# Patient Record
Sex: MALE | Race: WHITE | NOT HISPANIC OR LATINO | Employment: FULL TIME | URBAN - METROPOLITAN AREA
[De-identification: names, ages, dates, MRNs, and addresses within clinical notes are randomized per-mention and may not be internally consistent; named-entity substitution may affect disease eponyms.]

---

## 2023-08-04 DIAGNOSIS — G70.00 OCULAR MYASTHENIA GRAVIS: Primary | ICD-10-CM

## 2023-08-22 ENCOUNTER — OFFICE VISIT (OUTPATIENT)
Dept: NEUROLOGY | Facility: CLINIC | Age: 63
End: 2023-08-22
Payer: COMMERCIAL

## 2023-08-22 VITALS
SYSTOLIC BLOOD PRESSURE: 138 MMHG | BODY MASS INDEX: 38.6 KG/M2 | DIASTOLIC BLOOD PRESSURE: 80 MMHG | HEIGHT: 72 IN | WEIGHT: 285 LBS

## 2023-08-22 DIAGNOSIS — G70.00 OCULAR MYASTHENIA GRAVIS: ICD-10-CM

## 2023-08-22 PROCEDURE — 99205 OFFICE O/P NEW HI 60 MIN: CPT | Mod: S$GLB,,, | Performed by: SPECIALIST

## 2023-08-22 PROCEDURE — 99205 PR OFFICE/OUTPT VISIT, NEW, LEVL V, 60-74 MIN: ICD-10-PCS | Mod: S$GLB,,, | Performed by: SPECIALIST

## 2023-08-22 PROCEDURE — 99999 PR PBB SHADOW E&M-EST. PATIENT-LVL III: ICD-10-PCS | Mod: PBBFAC,,, | Performed by: SPECIALIST

## 2023-08-22 PROCEDURE — 99999 PR PBB SHADOW E&M-EST. PATIENT-LVL III: CPT | Mod: PBBFAC,,, | Performed by: SPECIALIST

## 2023-08-22 RX ORDER — PREDNISONE 20 MG/1
20 TABLET ORAL DAILY
COMMUNITY
Start: 2023-08-03 | End: 2023-10-24 | Stop reason: SDUPTHER

## 2023-08-22 RX ORDER — ALLOPURINOL 300 MG/1
300 TABLET ORAL 2 TIMES DAILY
COMMUNITY
Start: 2023-07-31

## 2023-08-22 RX ORDER — PREDNISONE 20 MG/1
20 TABLET ORAL DAILY
Qty: 60 TABLET | Refills: 5 | Status: SHIPPED | OUTPATIENT
Start: 2023-08-22 | End: 2024-01-30

## 2023-08-22 RX ORDER — MELOXICAM 15 MG/1
15 TABLET ORAL DAILY PRN
COMMUNITY
Start: 2023-07-14

## 2023-08-22 RX ORDER — COLCHICINE 0.6 MG/1
1 TABLET ORAL DAILY
COMMUNITY
Start: 2023-07-31

## 2023-08-22 RX ORDER — ALPRAZOLAM 0.5 MG/1
0.5 TABLET ORAL 2 TIMES DAILY PRN
COMMUNITY
Start: 2023-07-14

## 2023-08-22 RX ORDER — PYRIDOSTIGMINE BROMIDE 60 MG/1
60 TABLET ORAL 3 TIMES DAILY
Qty: 90 TABLET | Refills: 11 | Status: SHIPPED | OUTPATIENT
Start: 2023-08-22 | End: 2023-10-24

## 2023-08-22 RX ORDER — AZATHIOPRINE 50 MG/1
TABLET ORAL
Qty: 60 TABLET | Refills: 5 | Status: SHIPPED | OUTPATIENT
Start: 2023-08-22 | End: 2023-10-24

## 2023-08-22 RX ORDER — ZOLPIDEM TARTRATE 10 MG/1
10 TABLET ORAL NIGHTLY
COMMUNITY
Start: 2023-08-16

## 2023-08-22 RX ORDER — PREDNISONE 10 MG/1
1.5 TABLET ORAL DAILY
COMMUNITY
Start: 2023-08-03 | End: 2023-08-22

## 2023-08-22 NOTE — PROGRESS NOTES
Subjective:      @Patient ID: Vignesh Lomas is a 63 y.o. male.    Chief Complaint: MG NP     HPI:            New Patient (NP-double vision when looking to the right x 1 month )  AchR Ab positive (all 3) ordered by eye doctor.     Notes may also be on facesheet for HPI, ROS, and other sections     Review of Systems         Social History     Tobacco Use    Smoking status: Never    Smokeless tobacco: Never   Substance Use Topics    Alcohol use: Not Currently     Comment: beer/wine 1-2 times per week    Drug use: Never      [] Single     [x]     [] []   [x] Working     [] Retired, worked as:    Consulted and    [x] Drives     [] Does not drive     ----------------------------  Gout, unspecified    Current Outpatient Medications   Medication Instructions    allopurinoL (ZYLOPRIM) 300 mg, Oral, 2 times daily    ALPRAZolam (XANAX) 0.5 mg, Oral, 2 times daily PRN    azaTHIOprine (IMURAN) 50 mg Tab Take 1 tablet (50 mg total) by mouth once daily for 30 days, THEN 2 tablets (100 mg total) once daily.    colchicine, gout, (COLCRYS) 0.6 mg tablet 1 tablet, Oral, Daily    dulaglutide (TRULICITY SUBQ) Subcutaneous, 2 mls per week     meloxicam (MOBIC) 15 mg, Oral, Daily PRN    predniSONE (DELTASONE) 20 mg, Oral, Daily    predniSONE (DELTASONE) 20 mg, Oral, Daily    pyridostigmine (MESTINON) 60 mg, Oral, 3 times daily    vit A/vit C/vit E/zinc/copper (ICAPS AREDS ORAL) Oral, 2 per day     zolpidem (AMBIEN) 10 mg, Oral, Nightly      Medications Discontinued During This Encounter   Medication Reason    predniSONE (DELTASONE) 10 MG tablet          Objective:        Exam:   Visit Vitals  /80 (BP Location: Left arm, Patient Position: Sitting, BP Method: Medium (Manual))   Ht 6' (1.829 m)   Wt 129.3 kg (285 lb)   BMI 38.65 kg/m²       General Exam  [] Unaccompanied   [x] Accompanied, by__ [x]Spouse     []Child            []_____________            body habitus_ Body mass index is 38.65  kg/m².    []Gen exam overall unremarkable    []Abnormalities, if present, checked     []Mental Status_alert and appropriate    []Oropharynx_Mallampati grade_1 or 2  [] OP   M3    [] M4  []Neck_ no bruits     [] Bruit   [x]Heart__ RRR s murmurs or extra beats   [] Irreg  []murmur  []Extremities_ no edema or lesions   [] Extr edema     Neurological:  []Normal neuro exam          [x]Cortical function seems normal  Abnormalities, if present, checked     [x]Speech __ normal    []    Cranial nerves:    []  [x]CN 2 VF_ok     []  []Fundi_ normal     []  [x]CN 3, 4, 6 EOMs_ok   []  [x]CN 3, pupils_ok   []  [x]CN 7_no lower face asymmetry  []  []CN 8_hearing _ ok   []  [x]CN 12 tongue_ok   []    [x]Motor__ normal all groups   []  []Tone: normal     []  [x]Reflexes__ normal or unremarkable  []  [x]Vib Sens_ normal in extr's incl toes  []  []Pin Sens_    []  [x]Plantars__ flat     []  [x]Tremor: _ none    []  [x]Coordination: _ F to N normal  []  [x]Gait_      []Cane  []Wheelchair  []_______  [x]Romberg: negative    []Romberg positive     []MMSE; if done:  No flowsheet data found.     Neuroimaging:  [x] Images and imaging reports reviewed.  Rads summary:  My comments: bMRI normal     Labs: Ach R Ab's pos  Cbc ok   Chem's ok   A1c ok       [x]  New Patient         []  Multiple Issues/ diagnoses or problems  [if not enumerated in note then discussed but not documented]    Complexity of Data:   [x] High    [] Moderate   [] Images and reports reviewed  [] Other studies reviewed   [] History obtained from accompaniment [] Differential Diagnoses discussed   [] Studies considered/ discussed but not ordered [] Studies ordered     Risks:   [x] High     [] Moderate   [] (poss or definite) neurodegenerative condition [x] () autoimmune condition with possibility of flares or unexpected attack  [] () seiz d.o. 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     Dispense:  60 tablet     Refill:  5    predniSONE (DELTASONE) 20 MG tablet     Sig: Take 1 tablet (20 mg total) by mouth once daily.     Dispense:  60 tablet     Refill:  5    pyridostigmine (MESTINON) 60 mg Tab     Sig: Take 1 tablet (60 mg total) by mouth 3 (three) times daily.     Dispense:  90 tablet     Refill:  11      Pyridostigmine breakfast first week then breakfast lunch second week then breakfast lunch supper     Follow up in about 6 weeks (around 10/3/2023) for Virtual Visit.    Jamie Alonso MD CLAUDIA FAAN, [facility]  Neuroscience Center Medical Director   [facility]

## 2023-08-23 ENCOUNTER — TELEPHONE (OUTPATIENT)
Dept: NEUROLOGY | Facility: CLINIC | Age: 63
End: 2023-08-23
Payer: COMMERCIAL

## 2023-08-23 NOTE — TELEPHONE ENCOUNTER
Josh ortiz's levels of azathioprine  so I called pharm and he will take half tab first mo then whole tab

## 2023-08-23 NOTE — TELEPHONE ENCOUNTER
Elizabeth with [facility] called to report med interaction with rx sent yesterday. States they rec'd rx for azathioprine which has interaction with allopurinol. Calling for further direction    Phone: [phone]

## 2023-09-06 ENCOUNTER — PATIENT MESSAGE (OUTPATIENT)
Dept: NEUROLOGY | Facility: CLINIC | Age: 63
End: 2023-09-06
Payer: COMMERCIAL

## 2023-09-07 NOTE — TELEPHONE ENCOUNTER
CMP rec'd and forwarded to you; the CBC that was ordered was not completed  ..  The patient did have a CBC in July (forwarded to you): Do you still want this repeated?

## 2023-09-15 ENCOUNTER — PATIENT MESSAGE (OUTPATIENT)
Dept: NEUROLOGY | Facility: CLINIC | Age: 63
End: 2023-09-15
Payer: COMMERCIAL

## 2023-10-03 ENCOUNTER — OFFICE VISIT (OUTPATIENT)
Dept: NEUROLOGY | Facility: CLINIC | Age: 63
End: 2023-10-03
Payer: COMMERCIAL

## 2023-10-03 DIAGNOSIS — G70.00 OCULAR MYASTHENIA GRAVIS: Primary | ICD-10-CM

## 2023-10-03 PROCEDURE — 99213 OFFICE O/P EST LOW 20 MIN: CPT | Mod: 95,,, | Performed by: SPECIALIST

## 2023-10-03 PROCEDURE — 99213 PR OFFICE/OUTPT VISIT, EST, LEVL III, 20-29 MIN: ICD-10-PCS | Mod: 95,,, | Performed by: SPECIALIST

## 2023-10-03 NOTE — PROGRESS NOTES
This is a telemedicine note.   Patient was treated using telemedicine, real time audio and video, according to [facility] protocols.   I, Jamie Alonso MD, conducted the visit from the Neurology clinic of [facility].   The patient participated in the visit at a non-[facility] location selected by the patient, identified below.   I am licensed in the state where the patient stated they are located.   The patient stated that they understood and accepted the privacy and security risks to their information at their location.   This visit is not recorded.    Patient was located at the patient's home.     Vignesh Lomas is a 63 y.o. male seen today via telemedicine visit.       Subjective:         Patient ID: Vignesh Lomas is a 63 y.o. male.    Chief Complaint: diplopia (gone now)     HPI:           No chief complaint on file.  Vsion better gut is a wreck and he feels due to pyridostigmine     notes may also be on facesheet for HPI, ROS, and other sections     Review of Systems            Social History     Socioeconomic History    Marital status:    Tobacco Use    Smoking status: Never    Smokeless tobacco: Never   Substance and Sexual Activity    Alcohol use: Not Currently     Comment: beer/wine 1-2 times per week    Drug use: Never       Current Outpatient Medications   Medication Instructions    allopurinoL (ZYLOPRIM) 300 mg, Oral, 2 times daily    ALPRAZolam (XANAX) 0.5 mg, Oral, 2 times daily PRN    azaTHIOprine (IMURAN) 50 mg Tab Take 1 tablet (50 mg total) by mouth once daily     colchicine, gout, (COLCRYS) 0.6 mg tablet 1 tablet, Oral, Daily    dulaglutide (TRULICITY SUBQ) Subcutaneous, 2 mls per week     meloxicam (MOBIC) 15 mg, Oral, Daily PRN    predniSONE (DELTASONE) 20 mg, Oral, Daily    pyridostigmine (MESTINON) 60 mg, Oral, 3 times daily    vit A/vit C/vit E/zinc/copper (ICAPS AREDS ORAL) Oral, 2 per day     zolpidem (AMBIEN) 10 mg, Oral, Nightly        Objective:      Exam Limited due to  telemedicine restrictions.  There were no vitals taken for this visit.    General:   if accompanied, by:_w     Neurological  Speech: good   EOMs: seem ok   coord:   Gait:     Neuroimaging:  Images and imaging reports reviewed.  My comments:     Labs:    meds:        Assessment/Plan:       Problem List Items Addressed This Visit    None  Visit Diagnoses       Ocular myasthenia gravis    -  Primary          Stop pyridostigm since having gi se's     Cont predn 20mg   And cont imuran 50mg   Other comments/ follow up:        Imaging orders (if any):   No orders of the defined types were placed in this encounter.        __med refilled   __med prescribed  __med modified    Follow up in about 2 weeks (around 10/17/2023) for Virtual Visit.    Video Time Documentation:  Spent 5 minutes with patient over video discussing health concerns.   Total time this visit:    7  min    Jamie Alonso MD CLAUDIA FAAN FAASM

## 2023-10-11 ENCOUNTER — PATIENT MESSAGE (OUTPATIENT)
Dept: NEUROLOGY | Facility: CLINIC | Age: 63
End: 2023-10-11
Payer: COMMERCIAL

## 2023-10-24 ENCOUNTER — OFFICE VISIT (OUTPATIENT)
Dept: NEUROLOGY | Facility: CLINIC | Age: 63
End: 2023-10-24
Payer: COMMERCIAL

## 2023-10-24 DIAGNOSIS — G70.00 MYASTHENIA GRAVIS: Primary | ICD-10-CM

## 2023-10-24 PROCEDURE — 99214 OFFICE O/P EST MOD 30 MIN: CPT | Mod: 95,,, | Performed by: SPECIALIST

## 2023-10-24 PROCEDURE — 99214 PR OFFICE/OUTPT VISIT, EST, LEVL IV, 30-39 MIN: ICD-10-PCS | Mod: 95,,, | Performed by: SPECIALIST

## 2023-10-24 RX ORDER — PREDNISONE 20 MG/1
TABLET ORAL
Qty: 60 TABLET | Refills: 2 | Status: SHIPPED | OUTPATIENT
Start: 2023-10-24 | End: 2024-01-30

## 2023-10-24 RX ORDER — MYCOPHENOLATE MOFETIL 500 MG/1
500 TABLET ORAL 2 TIMES DAILY
Qty: 60 TABLET | Refills: 5 | Status: SHIPPED | OUTPATIENT
Start: 2023-10-24 | End: 2024-01-30 | Stop reason: SDUPTHER

## 2023-10-24 NOTE — PROGRESS NOTES
This is a telemedicine note.   Patient was treated using telemedicine, real time audio and video, according to [facility] protocols.   I, Jamie Alonso MD, conducted the visit from the Neurology clinic of [facility].   The patient participated in the visit at a non-[facility] location selected by the patient, identified below.   I am licensed in the state where the patient stated they are located.   The patient stated that they understood and accepted the privacy and security risks to their information at their location.   This visit is not recorded.    Patient was located at the patient's home.     Vignesh Lomas is a 63 y.o. male seen today via telemedicine visit.       Subjective:         Patient ID: Vignesh Lomas is a 63 y.o. male.    Chief Complaint: feeling well without diplopia or dysphagia     HPI:           No chief complaint on file.  As above   Asking about infusions   Asking about other steroid sparing po meds since he did not tolerate imuran   He is currently taking 20 mg prednisone daily.  He has  10 mg tabs at home as well.    notes may also be on facesheet for HPI, ROS, and other sections     Review of Systems  As above          Social History     Socioeconomic History    Marital status:    Tobacco Use    Smoking status: Never    Smokeless tobacco: Never   Substance and Sexual Activity    Alcohol use: Not Currently     Comment: beer/wine 1-2 times per week    Drug use: Never     __Lives alone  Lives with ___  __Drives   __Does not drive   __Working   Retired:     Current Outpatient Medications   Medication Instructions    allopurinoL (ZYLOPRIM) 300 mg, Oral, 2 times daily    ALPRAZolam (XANAX) 0.5 mg, Oral, 2 times daily PRN    colchicine, gout, (COLCRYS) 0.6 mg tablet 1 tablet, Oral, Daily    dulaglutide (TRULICITY SUBQ) Subcutaneous, 2 mls per week     meloxicam (MOBIC) 15 mg, Oral, Daily PRN    predniSONE (DELTASONE) 20 mg, Oral, Daily    predniSONE (DELTASONE) 20 mg, Oral, Daily    vit  A/vit C/vit E/zinc/copper (ICAPS AREDS ORAL) Oral, 2 per day     zolpidem (AMBIEN) 10 mg, Oral, Nightly        Objective:      Exam Limited due to telemedicine restrictions.  There were no vitals taken for this visit.    General:   if accompanied, by:_ wife at his side     Neurological  Speech: clear   EOMs:  L ptosis it seems but he did not complain of such   coord:   Gait:     Neuroimaging:  Images and imaging reports reviewed.  My comments:     Labs:  AchR Ab's had been positive     meds:      ___ multiple issues/ diagnoses or problems [if not enumerated in note then discussed in encounter but not documented]    complexity of data     __high _mod   Wife chimed in   Discussed steroid sparing options  Discussed why he is not a candidate for infusions    Risks    _;_high _mod   __ (possible or definite) neurodegenerative condition and inherent progression  _ * _ (poss or def) autoimmune condition with possibility of flares or unexpected attack  __ (poss or def) seiz d.o. with possib of recurr seiz's   __ cerebrovasc ds with risk of recurrence of stroke  _ * _ CNS meds (and/or) potentially high risk non CNS meds which may cause medical or behavioral side effects  __ fall risk  __ driving discussed   __ diagnosis unclear or DDx wide making risk uncertain to high  __other:    MDM/Medical Decision Making     __high  _ * moderate           Assessment/Plan:       Problem List Items Addressed This Visit    None  Visit Diagnoses       Myasthenia gravis    -  Primary            Other comments/ follow up:      Encouraged him to decrease his prednisone at 20 mg 1 day alternating with 10 mg the next until December 1st.  December 1st, I would like him to start 10 mg every day.  CellCept 500 mg twice daily starting now.  Aim for virtual follow-up visit in January.  The visit subsequent to that would ideally be a face-to-face visit  Imaging orders (if any):   No orders of the defined types were placed in this encounter.      Medications Ordered This Encounter   Medications    mycophenolate (CELLCEPT) 500 mg Tab     Sig: Take 1 tablet (500 mg total) by mouth 2 (two) times daily.     Dispense:  60 tablet     Refill:  5    predniSONE (DELTASONE) 20 MG tablet     Si mg alternating with 10 mg every other day until  at which time he will take 10 mg daily until further notice     Dispense:  60 tablet     Refill:  2       Follow up in about 3 months (around 2024) for Virtual Visit.    Video Time Documentation:  Spent 12 minutes with patient over video discussing health concerns.   Total time this visit:    18  min    Jamie Alonso MD CLAUDIA FAAN FAASM

## 2024-01-24 ENCOUNTER — TELEPHONE (OUTPATIENT)
Dept: NEUROLOGY | Facility: CLINIC | Age: 64
End: 2024-01-24
Payer: COMMERCIAL

## 2024-01-24 NOTE — TELEPHONE ENCOUNTER
"Pt contacted office regarding a "head cold"; denies fever. Episode of eye and arm twitching since becoming sick. Wanting to discuss over the counter treatment options for symptoms with his dx of MG. Please advise  "

## 2024-01-25 NOTE — TELEPHONE ENCOUNTER
S/w patient - he has covid - he is taking paxlovid; ok for Claritin or Zyrtec and regular Mucinex

## 2024-01-30 ENCOUNTER — OFFICE VISIT (OUTPATIENT)
Dept: NEUROLOGY | Facility: CLINIC | Age: 64
End: 2024-01-30
Payer: COMMERCIAL

## 2024-01-30 DIAGNOSIS — G70.00 MYASTHENIA GRAVIS: Primary | ICD-10-CM

## 2024-01-30 DIAGNOSIS — T14.8XXA BRUISING: ICD-10-CM

## 2024-01-30 PROCEDURE — 99214 OFFICE O/P EST MOD 30 MIN: CPT | Mod: 95,,, | Performed by: SPECIALIST

## 2024-01-30 RX ORDER — PREDNISONE 10 MG/1
TABLET ORAL
Qty: 90 TABLET | Refills: 1 | Status: SHIPPED | OUTPATIENT
Start: 2024-01-30

## 2024-01-30 RX ORDER — MYCOPHENOLATE MOFETIL 500 MG/1
1000 TABLET ORAL 2 TIMES DAILY
Qty: 120 TABLET | Refills: 5 | Status: SHIPPED | OUTPATIENT
Start: 2024-01-30 | End: 2024-07-28

## 2024-01-30 NOTE — PROGRESS NOTES
This is a telemedicine note.   Patient was treated using telemedicine, real time audio and video, according to [facility] protocols. This visit is not recorded.  I, Jamie Alonso MD, conducted the visit from the Neurology clinic of [facility]. The patient participated in the visit at a non-[facility] location selected by the patient, [facility].   I am licensed in [place] where the patient stated they are located. The patient stated that they understood and accepted the privacy and security risks to their information at their location.     Vignesh Lomas is a 63 y.o. male seen today via telemedicine visit.   Subjective:    Patient ID: Vignesh Lomas is a 63 y.o. male.  Chief Complaint: mg fu   HPI:           Wants off prednisone   eyes are great   some bruising   has cleared up some     Unfortunately did not follow the decr in prednisone dose I'd planned so curr still taking 20mg alternating with 10mg and cellcept at 500mg bid     Current Outpatient Medications   Medication Instructions    allopurinoL (ZYLOPRIM) 300 mg, Oral, 2 times daily    ALPRAZolam (XANAX) 0.5 mg, Oral, 2 times daily PRN    colchicine, gout, (COLCRYS) 0.6 mg tablet 1 tablet, Oral, Daily    dulaglutide (TRULICITY SUBQ) Subcutaneous, 2 mls per week     meloxicam (MOBIC) 15 mg, Oral, Daily PRN    mycophenolate (CELLCEPT) 500 mg, Oral, 2 times daily    predniSONE (DELTASONE) 20 MG tablet 20 mg alternating with 10 mg every other day     vit A/vit C/vit E/zinc/copper (ICAPS AREDS ORAL) Oral, 2 per day     zolpidem (AMBIEN) 10 mg, Oral, Nightly        Objective:      Exam Limited due to telemedicine restrictions.  There were no vitals taken for this visit.  if accompanied, by:_ n  Speech: ok   EOMs: seemed ok           Neuroimaging: MRI brain w/ and w/out @ [facility]: Reportedly ok  Labs:  Cbc ok in sept     AchR Ab   Blocking - 76  Modulating - 74  Binding - 3    _;__ multiple issues/ diagnoses or problems [if not enumerated in note then discussed in encounter  but not documented]    complexity of data     __high ;_mod   __ images and reports reviewed:  __ hx obtained from family or accompaniment:   __studies ordered __   __studies considered or discussed but not ordered __  __DDx discussed __    Risks    _;_high _mod   __ (possible or definite) neurodegenerative condition and inherent progression  _;_ (poss or def) autoimmune condition with possibility of flares or unexpected attack  __ (poss or def) seiz d.o. with possib of recurr seiz's   __ cerebrovasc ds with risk of recurrence of stroke  _;_ CNS meds (and/or) potentially high risk non CNS meds which may cause medical or behavioral side effects      MDM/Medical Decision Making     __high  ;_moderate   Assessment/Plan:     Problem List Items Addressed This Visit          Neuro    Myasthenia gravis - Primary       Orthopedic    Bruising       Other comments/ follow up:    Imaging orders (if any):   No orders of the defined types were placed in this encounter.     Medications Ordered This Encounter   Medications    mycophenolate (CELLCEPT) 500 mg Tab     Sig: Take 2 tablets (1,000 mg total) by mouth 2 (two) times daily.     Dispense:  120 tablet     Refill:  5    predniSONE (DELTASONE) 10 MG tablet     Sig: 10mg alternating with 5mg every other day for February.  March and until further notice 5mg every other day     Dispense:  90 tablet     Refill:  1       Video Time Documentation:  Spent 12 minutes with patient over video discussing health concerns.   Total time this visit:   20   min    Jamie Alonso MD CLAUDIA FAAN FAASM

## 2024-01-31 ENCOUNTER — PATIENT MESSAGE (OUTPATIENT)
Dept: NEUROLOGY | Facility: CLINIC | Age: 64
End: 2024-01-31
Payer: COMMERCIAL

## 2024-03-14 ENCOUNTER — PATIENT MESSAGE (OUTPATIENT)
Dept: NEUROLOGY | Facility: CLINIC | Age: 64
End: 2024-03-14
Payer: COMMERCIAL

## 2024-03-14 NOTE — TELEPHONE ENCOUNTER
No specific rec's other than the anesth ppl need to be aware he has myasthenia gravis     certain paralytics they should avoid but they should know this

## 2024-03-14 NOTE — LETTER
[facility] of [address] , [address]  [facility] [address]  Phone: [phone] March 15, 2024     Patient: Vignesh Lomas   YOB: 1960    Neuro surg clearance       To Whom It May Concern:    In regards to Mr. Lomas's upcoming surgical procedure:    From a neurological standpoint, no specific recommendations other than anesthesia provider/staff need to be aware he has myasthenia gravis. Certain paralytics they should avoid, however, they should be aware of what these are.          If you have any questions or concerns, please don't hesitate to contact my office.    Sincerely,            Kevin R Hargrave, MD MBA [facility] Medical Director   FAAN, [facility]/stacey

## 2024-04-05 NOTE — TELEPHONE ENCOUNTER
Nothing for you to do KD.    I called and spoke With him.    He is feeling fine off the prednisone.    We have a virtual visit in a few weeks.    knows that if symptoms recur he is to call us and probably will have to resume prednisone.

## 2024-04-23 ENCOUNTER — OFFICE VISIT (OUTPATIENT)
Dept: NEUROLOGY | Facility: CLINIC | Age: 64
End: 2024-04-23
Payer: COMMERCIAL

## 2024-04-23 DIAGNOSIS — G70.00 MYASTHENIA GRAVIS: Primary | ICD-10-CM

## 2024-04-23 PROCEDURE — 99213 OFFICE O/P EST LOW 20 MIN: CPT | Mod: 95,,, | Performed by: SPECIALIST

## 2024-04-23 NOTE — PROGRESS NOTES
Subjective:         Patient ID: Vignesh Lomas is a 64 y.o. male.    Chief Complaint: MG telemed fu       HPI:           MG stable   Recently had hernia surgery and did well but needs inguinal hernia sx now   Cellcept 1g bid   No prednisone since feb   No recent bruising but also not in rough conditions much   Asking about blood work       ...  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  General:   If Accompanied, by__ wife   heart:   pharynx:  Neurological   Speech: Ok        Labs:  Last cbc chem in sept were ok         Assessment/Plan:         ICD-10-CM ICD-9-CM   1. Myasthenia gravis  G70.00 358.00     Other comments/ follow up:      Aim revisit 6 mos telemed   His periop labs almost certainly were ok     Visit type: audiovisual    video time with patient: 10minutes     15 minutes of total time spent on the encounter, which includes face to face time and non-face to face time preparing to see the patient (eg, review of tests), Obtaining and/or reviewing separately obtained history, Documenting clinical information in the electronic or other health record, Independently interpreting results (not separately reported) and communicating results to the patient/family/caregiver, or Care coordination (not separately reported).       Each patient to whom he or she  provides medical services by telemedicine is:  (1) informed of the relationship between the physician and patient and the respective role of any other health care provider with respect to management of the patient; and (2) notified that he or she may decline to receive medical services by telemedicine and may withdraw from such care at any time.    The patient location is: home; or _____         MD EDDA AnthonyA FAAN FAASM

## 2024-08-06 ENCOUNTER — PATIENT MESSAGE (OUTPATIENT)
Dept: NEUROLOGY | Facility: CLINIC | Age: 64
End: 2024-08-06
Payer: COMMERCIAL

## 2024-08-06 NOTE — TELEPHONE ENCOUNTER
Spoke w him   I do not recommend but he may decide to take     I am ok either way   nothing for you to do

## 2024-08-27 ENCOUNTER — PATIENT MESSAGE (OUTPATIENT)
Dept: NEUROLOGY | Facility: CLINIC | Age: 64
End: 2024-08-27
Payer: COMMERCIAL

## 2024-08-27 NOTE — TELEPHONE ENCOUNTER
"S/w pt: he states symptoms started about 2-3 days ago; denies visual changes/diplopia/eyelid ptosis; states feels like his tongue "cramps":does have occ choking, but this is not new.  ..  Takes cellcept 1000mg BID     (no longer on prednisone: stopped months ago)  "

## 2024-08-28 ENCOUNTER — OFFICE VISIT (OUTPATIENT)
Dept: NEUROLOGY | Facility: CLINIC | Age: 64
End: 2024-08-28
Payer: COMMERCIAL

## 2024-08-28 VITALS
BODY MASS INDEX: 37.25 KG/M2 | WEIGHT: 275 LBS | HEIGHT: 72 IN | DIASTOLIC BLOOD PRESSURE: 78 MMHG | SYSTOLIC BLOOD PRESSURE: 124 MMHG

## 2024-08-28 DIAGNOSIS — G70.00 MYASTHENIA GRAVIS: ICD-10-CM

## 2024-08-28 DIAGNOSIS — G70.00 MYASTHENIA GRAVIS: Primary | ICD-10-CM

## 2024-08-28 PROCEDURE — 99999 PR PBB SHADOW E&M-EST. PATIENT-LVL III: CPT | Mod: PBBFAC,,, | Performed by: SPECIALIST

## 2024-08-28 PROCEDURE — 99215 OFFICE O/P EST HI 40 MIN: CPT | Mod: S$GLB,,, | Performed by: SPECIALIST

## 2024-08-28 RX ORDER — PREDNISONE 2.5 MG/1
TABLET ORAL
Qty: 60 TABLET | Refills: 2 | Status: SHIPPED | OUTPATIENT
Start: 2024-08-28 | End: 2024-08-28 | Stop reason: SDUPTHER

## 2024-08-28 RX ORDER — PREDNISONE 2.5 MG/1
TABLET ORAL
Qty: 56 TABLET | Refills: 0 | Status: SHIPPED | OUTPATIENT
Start: 2024-08-28

## 2024-08-28 RX ORDER — PYRIDOSTIGMINE BROMIDE 180 MG/1
180 TABLET, EXTENDED RELEASE ORAL DAILY
Qty: 30 TABLET | Refills: 11 | Status: SHIPPED | OUTPATIENT
Start: 2024-08-28 | End: 2025-08-28

## 2024-08-28 RX ORDER — MYCOPHENOLATE MOFETIL 500 MG/1
1000 TABLET ORAL 2 TIMES DAILY
COMMUNITY

## 2024-08-28 RX ORDER — PREDNISONE 2.5 MG/1
2.5 TABLET ORAL DAILY
Qty: 30 TABLET | Refills: 1 | Status: SHIPPED | OUTPATIENT
Start: 2024-09-26

## 2024-08-28 NOTE — PROGRESS NOTES
Subjective:         Patient ID: Vignesh Lomas is a 64 y.o. male.    Chief Complaint/HPI:   Chief Complaint   Patient presents with    c/o slurred speech     Here for c/o slurred speech    Pt reports onset of slurred speech 5 days ago; denies facial drooping. Some intermittent numbness to ketan legs. Some eyelid drooping at times per wife. Takes Cellcept 1000 mg BID.         Addn HPI:          In past mestinon caused GI distress and high dose steroids caused insomnia     ...  notes may also be on facesheet for HPI, ROS, and other sections   ROS:             Current Outpatient Medications   Medication Instructions    allopurinoL (ZYLOPRIM) 300 mg, Oral, 2 times daily    colchicine, gout, (COLCRYS) 0.6 mg tablet 1 tablet, Oral, Daily    meloxicam (MOBIC) 15 mg, Oral, Daily PRN    mycophenolate (CELLCEPT) 1,000 mg, Oral, 2 times daily    vit A/vit C/vit E/zinc/copper (ICAPS AREDS ORAL) Oral, 2 per day       Objective:      Exam  /78 (BP Location: Left arm, Patient Position: Sitting)   Ht 6' (1.829 m)   Wt 124.7 kg (275 lb)   BMI 37.30 kg/m²   General:   If Accompanied, by__ wife   heart: rrr   pharynx:  Neurological   Speech: Slightly dysarthria  vis fields:    EOMs: Ok    funduscopic:   Motor:  Ok deltoids good without fatiguing   arose from chair with arms folded    coord:     Gait:  Ok      Neuroimaging:  []Images and imaging reports reviewed. My comments:     Labs:    []  Multiple Issues/ diagnoses or problems  [if not enumerated in note then discussed but not documented]    Complexity of Data:   [] High    [] Moderate   [] Images and reports reviewed [] History obtained from accompaniment  [] Studies ordered [] Studies consid or discussed, not ordered   [] Differential Diagnoses discussed     Risks:   [x] High     [] Moderate   [] (poss or def) neurodegenerative condition [x] () autoimmune condition with possibility of flares or unexpected attack  [] () seiz d.o. with possib of recurr seiz's  [] Cerebrovasc  ds with risk of recurrent stroke  [] CNS meds (and/or) potentially high risk non CNS meds taken or discussed which may cause med or behav SE's  [] Fall risk [] Driving discussed  [] Diagnosis unclear or DDx wide making risk uncertain   []:    MDM:    [] High     [x] Moderate         Assessment/Plan:         ICD-10-CM ICD-9-CM   1. Myasthenia gravis  G70.00 358.00     Other comments/ follow up:        No orders of the defined types were placed in this encounter.      Medications Ordered This Encounter   Medications    predniSONE (DELTASONE) 2.5 MG tablet     Sig: As directed     Dispense:  60 tablet     Refill:  2    pyridostigmine (MESTINON) 180 mg TbSR     Sig: Take 1 tablet (180 mg total) by mouth once daily.     Dispense:  30 tablet     Refill:  11     Patient Instructions    Prednisone 10mg daily one week then 5 mg daily one week then 2.5 mg daily until further notice   Mestinon long acting once daily   Aim virtual visit 9.16    Jamie Alonso MD CLAUDIA FA[facility] FAASM

## 2024-09-16 ENCOUNTER — OFFICE VISIT (OUTPATIENT)
Dept: NEUROLOGY | Facility: CLINIC | Age: 64
End: 2024-09-16
Payer: COMMERCIAL

## 2024-09-16 DIAGNOSIS — G70.00 MYASTHENIA GRAVIS: Primary | ICD-10-CM

## 2024-09-16 PROCEDURE — 99214 OFFICE O/P EST MOD 30 MIN: CPT | Mod: 95,,, | Performed by: SPECIALIST

## 2024-09-16 NOTE — PROGRESS NOTES
This is a telemedicine note. See bottom of note for boilerplate elements.     Vignesh Lomas is a 64 y.o. male seen today via telemedicine visit.   Subjective:    Patient ID: Vignesh Lomas is a 64 y.o. male.  Chief Complaint: virtual follow up for dx or symptoms: MG    HPI:         had sinus inf in recent days and got on amoxicill and his primary office incr his prednisone to 10mg bid   His tongue occas feels weak especially when brushing his teeth     Denies choking but is very careful     Current Outpatient Medications   Medication Instructions    allopurinoL (ZYLOPRIM) 300 mg, Oral, 2 times daily    colchicine, gout, (COLCRYS) 0.6 mg tablet 1 tablet, Oral, Daily    meloxicam (MOBIC) 15 mg, Oral, Daily PRN    mycophenolate (CELLCEPT) 1,000 mg, Oral, 2 times daily    predniSONE (DELTASONE) 2.5 MG tablet Week 1: Take 10 mg daily (4 tablets) for 1 week. Week 2: Take 5 mg daily (2 tablets) for 1 week. Week 3: Take 2.5 mg (1 tablet) daily from there on    [START ON 9/26/2024] predniSONE (DELTASONE) 2.5 mg, Oral, Daily    pyridostigmine (MESTINON) 180 mg, Oral, Daily    vit A/vit C/vit E/zinc/copper (ICAPS AREDS ORAL) Oral, 2 per day         Objective:      Exam Limited due to telemedicine restrictions.  There were no vitals taken for this visit.  if accompanied, by:_ wife off the screen I could hear her chime in   Speech: strong sounding       Neuroimaging:  []Images and imaging reports reviewed.  My comments:     Labs: ach R ab had been pos in the past     meds:      ___ multiple issues/ diagnoses or problems [if not enumerated in note then discussed in encounter but not documented]    complexity of data     __high _mod   __ images and reports reviewed __ hx obtained from family or accompaniment __studies ordered __   __studies considered or discussed but not ordered __ __DDx discussed    Risks    _;_high   _;_ autoimmune condition with possibility of flares or unexpected attack  _;_ CNS meds (and/or) potentially high  risk non CNS meds which may cause medical or behavioral side effects  May need escalate therapy in weeks ahead if worsens     MDM/Medical Decision Making     __high  ;_moderate   Assessment/Plan:     Problem List Items Addressed This Visit          Neuro    Myasthenia gravis - Primary       Other comments/ follow up:    Imaging orders (if any):   No orders of the defined types were placed in this encounter.   Go to 5mg daily on prednisone once his few d of 10mg bid done   Aim F to F visit no later than mid Oct and may need escalate therapy if worsens in near future [Vyvgart vs Ultomiris vs other]         Video Time Documentation:  Spent 7 minutes with patient over video discussing health concerns.   Total time this visit:     11 minutes    This is a telemedicine note.   Patient was treated using telemedicine, real time audio and video, according to [facility] protocols. This visit is not recorded.  I, Jamie Alonso MD, conducted the visit from the Neurology clinic of [facility]. The patient participated in the visit at a non-[facility] location selected by the patient, [facility].   I am licensed in [place] where the patient stated they are located. The patient stated that they understood and accepted the privacy and security risks to their information at their location.

## 2024-10-07 ENCOUNTER — TELEPHONE (OUTPATIENT)
Dept: NEUROLOGY | Facility: CLINIC | Age: 64
End: 2024-10-07
Payer: COMMERCIAL

## 2024-10-07 ENCOUNTER — PATIENT MESSAGE (OUTPATIENT)
Dept: NEUROLOGY | Facility: CLINIC | Age: 64
End: 2024-10-07
Payer: COMMERCIAL

## 2024-10-08 ENCOUNTER — OFFICE VISIT (OUTPATIENT)
Dept: NEUROLOGY | Facility: CLINIC | Age: 64
End: 2024-10-08
Payer: COMMERCIAL

## 2024-10-08 VITALS
DIASTOLIC BLOOD PRESSURE: 88 MMHG | WEIGHT: 275 LBS | SYSTOLIC BLOOD PRESSURE: 126 MMHG | BODY MASS INDEX: 37.25 KG/M2 | HEIGHT: 72 IN

## 2024-10-08 DIAGNOSIS — G70.00 MYASTHENIA GRAVIS: Primary | ICD-10-CM

## 2024-10-08 PROCEDURE — 99999 PR PBB SHADOW E&M-EST. PATIENT-LVL III: CPT | Mod: PBBFAC,,, | Performed by: SPECIALIST

## 2024-10-08 PROCEDURE — 99215 OFFICE O/P EST HI 40 MIN: CPT | Mod: S$GLB,,, | Performed by: SPECIALIST

## 2024-10-08 RX ORDER — AZELASTINE 1 MG/ML
2 SPRAY, METERED NASAL 2 TIMES DAILY
COMMUNITY
Start: 2024-09-13

## 2024-10-08 NOTE — PROGRESS NOTES
Subjective:         Patient ID: Vignesh Lomas is a 64 y.o. male.    Chief Complaint/HPI:   Chief Complaint   Patient presents with    worsening MG symptoms     Here for worsening MG symptoms    Pt reports onset of worsening symptoms last week; worsening ptosis to L eye, slurred speech, diff swallowing and chewing (R side); was unable to see out of L eye at all this morning when he closed R eye. Taking Cellcept 1000 mg BID and prednisone 10 mg daily     Addn HPI:              Blocking - 76  Modulating - 74  Binding - 3  --    Imuran in past w se's     ...  notes may also be on facesheet for HPI, ROS, and other sections   ROS:          Current Outpatient Medications   Medication Instructions    allopurinoL (ZYLOPRIM) 300 mg, Daily    azelastine (ASTELIN) 137 mcg (0.1 %) nasal spray 2 sprays, 2 times daily    colchicine, gout, (COLCRYS) 0.6 mg tablet 1 tablet, Daily    mycophenolate (CELLCEPT) 1,000 mg, 2 times daily    predniSONE (DELTASONE) 2.5 mg, Oral, Daily    pyridostigmine (MESTINON) 180 mg, Oral, Daily    vit A/vit C/vit E/zinc/copper (ICAPS AREDS ORAL) Take by mouth. 2 per day      Objective:      Exam  /88 (BP Location: Left arm)   Ht 6' (1.829 m)   Wt 124.7 kg (275 lb)   BMI 37.30 kg/m²   General:   If Accompanied, by__ son   heart:   pharynx:  Neurological   Speech: Sounds ok with a little dysarthria but son said on ride here he could not understand him   vis fields:    EOMs: Ok   But L ptosis [this am he said was complete L ptosis]     Motor:  Stood w arms folded      coord:     Gait:  Unassisted      Neuroimaging:  MRI brain w/ and w/out @ [facility]: Reportedly ok     Labs:    Multiple Issues/ diagnoses or problems  [if not enumerated in note then discussed but not documented]    Complexity of Data:    Moderate     Risks:  High    () autoimmune condition with possibility of flares or unexpected attack  CNS meds (and/or) potentially high risk non CNS meds taken or discussed which may cause med or behav  SE's  Thymectomy discussed     Vyvgart discussed   Ultomiris discussed     MDM:    High        Assessment/Plan:         ICD-10-CM ICD-9-CM   1. Myasthenia gravis  G70.00 358.00     Other comments/ follow up:        Stop Cellcept   Pyridostigmine 180mg twice daily   May increase prednisone to 10mg alternating with 15mg every other day if the incr of pyridostigmine not effective   Thymectomy discussed and vyvgart and ultomiris also discussed      Patient Instructions          Jamie Alonso MD CLAUDIA FAAN FAASM

## 2024-10-14 ENCOUNTER — PATIENT MESSAGE (OUTPATIENT)
Dept: NEUROLOGY | Facility: CLINIC | Age: 64
End: 2024-10-14
Payer: COMMERCIAL

## 2024-10-15 NOTE — TELEPHONE ENCOUNTER
We spoke   he will go to three times daily on the 180mg mestinon timespan and he wants to go to [facility]       I offered to order vyvgart for him he declines at present     I asked that he incr his prednisone to 10mg alternating with 15mg qod    he's curr doing 10mg daily

## 2024-10-18 ENCOUNTER — TELEPHONE (OUTPATIENT)
Dept: NEUROLOGY | Facility: CLINIC | Age: 64
End: 2024-10-18
Payer: COMMERCIAL

## 2024-10-18 DIAGNOSIS — G70.00 MYASTHENIA GRAVIS: Primary | ICD-10-CM

## 2024-10-18 NOTE — TELEPHONE ENCOUNTER
The patient is requesting a referral be sent to Dr. Gianluca Huber.   Fax- [phone]  [phone]

## 2024-10-28 DIAGNOSIS — G70.00 MYASTHENIA GRAVIS: ICD-10-CM

## 2024-10-28 RX ORDER — PYRIDOSTIGMINE BROMIDE 180 MG/1
180 TABLET, EXTENDED RELEASE ORAL 3 TIMES DAILY
Qty: 90 TABLET | Refills: 5 | Status: SHIPPED | OUTPATIENT
Start: 2024-10-28 | End: 2025-10-28

## 2024-11-11 ENCOUNTER — TELEPHONE (OUTPATIENT)
Dept: NEUROLOGY | Facility: CLINIC | Age: 64
End: 2024-11-11
Payer: COMMERCIAL

## 2024-11-11 NOTE — TELEPHONE ENCOUNTER
Patient's pharmacy ([facility] is calling to get a new prescription  for Abebe Ryan for his prednisone  prescription. Pt. Is telling the pharmacist that he takes 10 mg  every othe day and on opposite days he takes 5 mg. The prescription they have is different.

## 2024-11-13 DIAGNOSIS — G70.00 MYASTHENIA GRAVIS: ICD-10-CM

## 2024-11-13 RX ORDER — PREDNISONE 5 MG/1
TABLET ORAL
Qty: 60 TABLET | Refills: 5 | Status: SHIPPED | OUTPATIENT
Start: 2024-11-13

## 2025-01-06 ENCOUNTER — PATIENT MESSAGE (OUTPATIENT)
Dept: NEUROLOGY | Facility: CLINIC | Age: 65
End: 2025-01-06
Payer: COMMERCIAL

## 2025-01-22 ENCOUNTER — PATIENT MESSAGE (OUTPATIENT)
Dept: NEUROLOGY | Facility: CLINIC | Age: 65
End: 2025-01-22
Payer: COMMERCIAL

## 2025-01-24 ENCOUNTER — OFFICE VISIT (OUTPATIENT)
Dept: NEUROLOGY | Facility: CLINIC | Age: 65
End: 2025-01-24
Payer: COMMERCIAL

## 2025-01-24 VITALS
WEIGHT: 270 LBS | BODY MASS INDEX: 36.57 KG/M2 | SYSTOLIC BLOOD PRESSURE: 132 MMHG | HEIGHT: 72 IN | DIASTOLIC BLOOD PRESSURE: 80 MMHG

## 2025-01-24 DIAGNOSIS — G70.00 MYASTHENIA GRAVIS: Primary | ICD-10-CM

## 2025-01-24 PROCEDURE — 99999 PR PBB SHADOW E&M-EST. PATIENT-LVL III: CPT | Mod: PBBFAC,,, | Performed by: NURSE PRACTITIONER

## 2025-01-24 PROCEDURE — 99215 OFFICE O/P EST HI 40 MIN: CPT | Mod: S$GLB,,, | Performed by: NURSE PRACTITIONER

## 2025-01-24 RX ORDER — PREDNISONE 10 MG/1
10 TABLET ORAL DAILY
COMMUNITY
End: 2025-01-24 | Stop reason: SDUPTHER

## 2025-01-24 RX ORDER — ZOLPIDEM TARTRATE 10 MG/1
10 TABLET ORAL NIGHTLY
COMMUNITY
Start: 2025-01-17

## 2025-01-24 RX ORDER — PREDNISONE 10 MG/1
10 TABLET ORAL DAILY
Qty: 30 TABLET | Refills: 5 | Status: SHIPPED | OUTPATIENT
Start: 2025-01-24

## 2025-01-24 RX ORDER — PYRIDOSTIGMINE BROMIDE 60 MG/1
60 TABLET ORAL 4 TIMES DAILY
COMMUNITY

## 2025-01-24 RX ORDER — ALPRAZOLAM 0.5 MG/1
0.5 TABLET ORAL 2 TIMES DAILY PRN
COMMUNITY
Start: 2025-01-17

## 2025-01-24 NOTE — PROGRESS NOTES
Neurology Follow up Note    Subjective:         Patient ID: Vignesh Lomas is a 64 y.o. male.    Chief Complaint: Myasthenia Gravis- F/U     HPI:            Patient presents for f/u visit for Myasthenia Gravis.     Pt.  Reports he's been slurring his speech and some difficulty swallowing. Saliva pools, and difficulty closing lips tightly. Patient states he stopped his   Prednisone on his own abruptly. Now experiencing symptoms of MG.    Denies shortness of breath or weakness aside from his cheek puff, chewing, and swallowing. Would like to discuss  his medications.    Denies diplopia   Denies ext weakness    Did visit with Neurologist at [facility], Dr. Huber - suggested that he take regular Mestinon 60-90 mg, every 4 hours during the day and take Mestinon slow release (180 mg) only at bedtime and continue on prednisone at the current dose (10 mg alt with 15 mg) and no further dose reduction should be initiated for at least another 2-3 month.      ROS: as per HPI, otherwise pertinent systems review is negative          Past Medical History:   Diagnosis Date    Gout, unspecified        Past Surgical History:   Procedure Laterality Date    FOOT SURGERY Left     02/08/2023 achilles tendon    HERNIA REPAIR      HERNIA REPAIR      SKIN CANCER EXCISION         No family history on file.    Social History     Socioeconomic History    Marital status:    Tobacco Use    Smoking status: Never    Smokeless tobacco: Never   Substance and Sexual Activity    Alcohol use: Not Currently     Comment: beer/wine 1-2 times per week    Drug use: Never     Social Drivers of Health     Food Insecurity: No Food Insecurity (11/4/2024)    Received from [facility] Vital Sign     Worried About Running Out of Food in the Last Year: Never true     Ran Out of Food in the Last Year: Never true       Review of patient's allergies indicates:  No Known Allergies    Current Outpatient Medications   Medication Instructions     allopurinoL (ZYLOPRIM) 300 mg, Daily    ALPRAZolam (XANAX) 0.5 mg, 2 times daily PRN    azelastine (ASTELIN) 137 mcg (0.1 %) nasal spray 2 sprays, 2 times daily    colchicine, gout, (COLCRYS) 0.6 mg tablet 1 tablet, Daily    predniSONE (DELTASONE) 10 MG tablet 1 daily as directed by neurology clinic    pyridostigmine (MESTINON) 180 mg, Oral, 3 times daily    vit A/vit C/vit E/zinc/copper (ICAPS AREDS ORAL) Take by mouth. 2 per day    zolpidem (AMBIEN) 10 mg, Nightly       Objective:      Exam:   Visit Vitals  /80 (BP Location: Left arm, Patient Position: Sitting)   Ht 6' (1.829 m)   Wt 122.5 kg (270 lb)   BMI 36.62 kg/m²       Physical Exam  Vitals reviewed.   Constitutional:       Appearance: Normal appearance.      Accompanied by: alone   HENT:      Ears:      Comments: Hearing normal.  Eyes:      Extraocular Movements: Extraocular movements intact. B ptosis but      subtle      VF's ok  Cardiovascular:      Rate and Rhythm: Normal rate and regular rhythm.   Pulmonary:      Effort: Pulmonary effort is normal.      Breath sounds: Normal breath sounds.     No shortness of breath    Musculoskeletal:         General: Normal range of motion.   Skin:     General: Skin is warm and dry.   Neurological:      General: No focal deficit present.      Mental Status: alert and oriented to person, place, and time.      Speech: slurred     Face: symmetric; tongue midline; cheek puff very weak      Motor: nonlateralizing; deltoids not fatigable; neck flexors are 5/5; eyelids 3/5      Coordination: F to N ok, no dysmetria     Tone: No ankle clonus     Tremor: none     Gait: unassisted and normal. Stands from chair with arms folded on first         attempt  Psychiatric:         Mood and Affect: Mood normal.         Behavior: Behavior normal.         Assessment/Plan:   1. Myasthenia gravis (Primary)  Increase Prednisone to 20 mg daily x 7 days then 15 mg daily for 7 days then 10 mg alt with 15 mg daily thereafter    Continue  the Mestinon 60 mg QID and 180 mg at bed time     Consider Vyvgart - discussed today    Follow up in about 4 weeks (around 2/21/2025).      Hector Patino, MSN, APRN, AGACNP-BC

## 2025-01-30 ENCOUNTER — PATIENT MESSAGE (OUTPATIENT)
Dept: NEUROLOGY | Facility: CLINIC | Age: 65
End: 2025-01-30
Payer: COMMERCIAL

## 2025-02-04 ENCOUNTER — OFFICE VISIT (OUTPATIENT)
Dept: NEUROLOGY | Facility: CLINIC | Age: 65
End: 2025-02-04
Payer: COMMERCIAL

## 2025-02-04 VITALS
BODY MASS INDEX: 36.57 KG/M2 | SYSTOLIC BLOOD PRESSURE: 168 MMHG | WEIGHT: 270 LBS | DIASTOLIC BLOOD PRESSURE: 92 MMHG | HEIGHT: 72 IN

## 2025-02-04 DIAGNOSIS — G70.00 MYASTHENIA GRAVIS: ICD-10-CM

## 2025-02-04 PROCEDURE — 99999 PR PBB SHADOW E&M-EST. PATIENT-LVL III: CPT | Mod: PBBFAC,,, | Performed by: NURSE PRACTITIONER

## 2025-02-04 PROCEDURE — 99215 OFFICE O/P EST HI 40 MIN: CPT | Mod: S$GLB,,, | Performed by: NURSE PRACTITIONER

## 2025-02-04 RX ORDER — PREDNISONE 10 MG/1
30 TABLET ORAL DAILY
Qty: 90 TABLET | Refills: 5 | Status: SHIPPED | OUTPATIENT
Start: 2025-02-04

## 2025-02-04 NOTE — PROGRESS NOTES
Neurology Follow up Note    Subjective:         Patient ID: Vignesh Lomas is a 64 y.o. male.    Chief Complaint: F/U MG; worsening of symptoms    HPI:            Increased Prednisone to 20 mg daily    Here today C/O slurred speech is more often and diff w swallowing and a lot of drooling and easily fatigued.      Some episodes of diplopia       ROS: as per HPI, otherwise pertinent systems review is negative          Past Medical History:   Diagnosis Date    Gout, unspecified        Past Surgical History:   Procedure Laterality Date    FOOT SURGERY Left     02/08/2023 achilles tendon    HERNIA REPAIR      HERNIA REPAIR      SKIN CANCER EXCISION         No family history on file.    Social History     Socioeconomic History    Marital status:    Tobacco Use    Smoking status: Never    Smokeless tobacco: Never   Substance and Sexual Activity    Alcohol use: Not Currently     Comment: beer/wine 1-2 times per week    Drug use: Never     Social Drivers of Health     Food Insecurity: No Food Insecurity (11/4/2024)    Received from [facility] Vital Sign     Worried About Running Out of Food in the Last Year: Never true     Ran Out of Food in the Last Year: Never true       Review of patient's allergies indicates:  No Known Allergies    Current Outpatient Medications   Medication Instructions    ALPRAZolam (XANAX) 0.5 mg, 2 times daily PRN    azelastine (ASTELIN) 137 mcg (0.1 %) nasal spray 2 sprays, Nightly PRN    predniSONE (DELTASONE) 10 mg, Oral, Daily, 20 mg every morning for 7 days then 15 mg x7 days then 10 mg alt with 15 mg daily thereafter    pyridostigmine (MESTINON) 180 mg, Oral, 3 times daily    pyridostigmine (MESTINON) 60 mg, 4 times daily    zolpidem (AMBIEN) 10 mg, Nightly PRN       Objective:      Exam:   Visit Vitals  BP (!) 168/92 (BP Location: Left arm, Patient Position: Sitting)   Ht 6' (1.829 m)   Wt 122.5 kg (270 lb)   BMI 36.62 kg/m²       Physical Exam  Vitals reviewed.    Constitutional:       Appearance: Normal appearance.      Accompanied by: alone   HENT:      Ears:      Comments: Hearing normal.  Eyes:      Extraocular Movements: Extraocular movements intact. B ptosis but      subtle      VF's ok  Cardiovascular:      Rate and Rhythm: Normal rate and regular rhythm.   Pulmonary:      Effort: Pulmonary effort is normal.      Breath sounds: Normal breath sounds.     No shortness of breath    Musculoskeletal:         General: Normal range of motion.   Skin:     General: Skin is warm and dry.   Neurological:      General: No focal deficit present.      Mental Status: alert and oriented     Speech: slurred     Face: symmetric; tongue midline; cheek puff very weak      Motor: nonlateralizing; deltoids not fatigable     Gait: unassisted and normal. Stands from chair with arms folded on first         attempt  Psychiatric:         Mood and Affect: Mood normal.         Behavior: Behavior normal.         Assessment/Plan:   Myasthenia Gravis    Diabetic MG; Imuran in past w se's   7.2023 Blocking 76; Modulating 74; Binding 3    Check CBC, CMP    Discussed Vyvgart:  Discussed Ultimaris     Will need vaccination for meningitis     Medication administration personally reviewed with patient by MD/provider. Potential or actual medication changes discussed. Common and potentially serious side effects of medications or medication changes discussed.    Increase the Prednisone 30 mg daily     Continue the Mestinon 60 mg QID and 180 mg at bed time     Thymectomy discussed - he mentioned having a CT chest in the past at [facility] that was reportedly ok     Keep 04/2025 KASSY Patino, MSN, APRN, AGACNP-BC

## 2025-02-06 ENCOUNTER — PATIENT MESSAGE (OUTPATIENT)
Dept: NEUROLOGY | Facility: CLINIC | Age: 65
End: 2025-02-06
Payer: COMMERCIAL

## 2025-03-13 ENCOUNTER — PATIENT MESSAGE (OUTPATIENT)
Dept: NEUROLOGY | Facility: CLINIC | Age: 65
End: 2025-03-13
Payer: COMMERCIAL

## 2025-03-13 NOTE — TELEPHONE ENCOUNTER
I see that a resident or fellow wrote for 0.4g/kg ivig daily for 5 d [total dose in that 5 d two gr/kg]    then 0.4g/kg iv ig weekly until further notice       I agree with this        if he wants us to arrange we can      appears there's an order in epic but unsure if triggered or sent anywhere, Janie?    Maybe a home infusion service Janie curry I think he lives away from here   thanks Janie    you can ask hans to help I suppose

## 2025-03-14 RX ORDER — ACETAMINOPHEN 325 MG/1
650 TABLET ORAL ONCE
OUTPATIENT
Start: 2025-03-14 | End: 2025-03-14

## 2025-03-14 RX ORDER — SODIUM CHLORIDE 0.9 % (FLUSH) 0.9 %
10 SYRINGE (ML) INJECTION
OUTPATIENT
Start: 2025-03-14

## 2025-03-14 RX ORDER — HEPARIN 100 UNIT/ML
500 SYRINGE INTRAVENOUS
OUTPATIENT
Start: 2025-03-14

## 2025-03-24 ENCOUNTER — TELEPHONE (OUTPATIENT)
Dept: NEUROLOGY | Facility: CLINIC | Age: 65
End: 2025-03-24
Payer: COMMERCIAL

## 2025-03-24 DIAGNOSIS — G70.00 MYASTHENIA GRAVIS: Primary | ICD-10-CM

## 2025-03-24 NOTE — TELEPHONE ENCOUNTER
Spoke to Mr. Medellin this morning about insurance needing Immunoglobulin blood test done in order to authorize his IVIG. Called and spoke to the [facility] clinic in [place] where he gets his lab work done. Sending lab orders now to them via fax and patient is going to AM to get them done.       Thanks,     Earl Victor, PharmD  Clinical Pharmacist - Neurology  [facility] [place]   O: [phone]

## 2025-03-27 ENCOUNTER — TELEPHONE (OUTPATIENT)
Dept: NEUROLOGY | Facility: CLINIC | Age: 65
End: 2025-03-27

## 2025-03-27 ENCOUNTER — PATIENT MESSAGE (OUTPATIENT)
Dept: NEUROLOGY | Facility: CLINIC | Age: 65
End: 2025-03-27
Payer: COMMERCIAL

## 2025-03-27 ENCOUNTER — HOSPITAL ENCOUNTER (INPATIENT)
Facility: HOSPITAL | Age: 65
LOS: 6 days | Discharge: HOME | DRG: 056 | End: 2025-04-02
Attending: EMERGENCY MEDICINE | Admitting: INTERNAL MEDICINE
Payer: COMMERCIAL

## 2025-03-27 DIAGNOSIS — G70.00 MYASTHENIA GRAVIS: Primary | ICD-10-CM

## 2025-03-27 DIAGNOSIS — G70.01 MYASTHENIA GRAVIS IN CRISIS: ICD-10-CM

## 2025-03-27 DIAGNOSIS — G70.01 MYASTHENIC CRISIS: ICD-10-CM

## 2025-03-27 DIAGNOSIS — R13.10 DIFFICULTY SWALLOWING: ICD-10-CM

## 2025-03-27 LAB
ALBUMIN SERPL-MCNC: 4.1 G/DL (ref 3.4–4.8)
ALBUMIN/GLOB SERPL: 1.1 RATIO (ref 1.1–2)
ALLENS TEST BLOOD GAS (OHS): YES
ALP SERPL-CCNC: 80 UNIT/L (ref 40–150)
ALT SERPL-CCNC: 23 UNIT/L (ref 0–55)
ANION GAP SERPL CALC-SCNC: 10 MEQ/L
APTT PPP: 29 SECONDS (ref 23.2–33.7)
AST SERPL-CCNC: 16 UNIT/L (ref 11–45)
BACTERIA #/AREA URNS AUTO: ABNORMAL /HPF
BASE EXCESS BLD CALC-SCNC: 2.4 MMOL/L
BASOPHILS # BLD AUTO: 0.04 X10(3)/MCL
BASOPHILS NFR BLD AUTO: 0.2 %
BILIRUB SERPL-MCNC: 3 MG/DL
BILIRUB UR QL STRIP.AUTO: NEGATIVE
BLOOD GAS SAMPLE TYPE (OHS): ABNORMAL
BUN SERPL-MCNC: 18.2 MG/DL (ref 8.4–25.7)
CA-I BLD-SCNC: 1.13 MMOL/L (ref 1.12–1.23)
CALCIUM SERPL-MCNC: 9.8 MG/DL (ref 8.8–10)
CHLORIDE SERPL-SCNC: 105 MMOL/L (ref 98–107)
CLARITY UR: CLEAR
CO2 BLDA-SCNC: 27.6 MMOL/L
CO2 SERPL-SCNC: 26 MMOL/L (ref 23–31)
COLOR UR AUTO: YELLOW
CREAT SERPL-MCNC: 0.79 MG/DL (ref 0.72–1.25)
CREAT/UREA NIT SERPL: 23
DRAWN BY BLOOD GAS (OHS): ABNORMAL
EOSINOPHIL # BLD AUTO: 0.18 X10(3)/MCL (ref 0–0.9)
EOSINOPHIL NFR BLD AUTO: 1 %
ERYTHROCYTE [DISTWIDTH] IN BLOOD BY AUTOMATED COUNT: 13 % (ref 11.5–17)
GFR SERPLBLD CREATININE-BSD FMLA CKD-EPI: >60 ML/MIN/1.73/M2
GLOBULIN SER-MCNC: 3.6 GM/DL (ref 2.4–3.5)
GLUCOSE SERPL-MCNC: 104 MG/DL (ref 82–115)
GLUCOSE UR QL STRIP: NORMAL
HBV SURFACE AG SERPL QL IA: NONREACTIVE
HCO3 BLDA-SCNC: 26.4 MMOL/L (ref 22–26)
HCT VFR BLD AUTO: 48.2 % (ref 42–52)
HGB BLD-MCNC: 16.2 G/DL (ref 14–18)
HGB UR QL STRIP: NEGATIVE
IMM GRANULOCYTES # BLD AUTO: 0.08 X10(3)/MCL (ref 0–0.04)
IMM GRANULOCYTES NFR BLD AUTO: 0.5 %
INHALED O2 CONCENTRATION: 60 %
KETONES UR QL STRIP: ABNORMAL
LEUKOCYTE ESTERASE UR QL STRIP: NEGATIVE
LYMPHOCYTES # BLD AUTO: 1.79 X10(3)/MCL (ref 0.6–4.6)
LYMPHOCYTES NFR BLD AUTO: 10.3 %
MCH RBC QN AUTO: 29.9 PG (ref 27–31)
MCHC RBC AUTO-ENTMCNC: 33.6 G/DL (ref 33–36)
MCV RBC AUTO: 89.1 FL (ref 80–94)
MECH RR (OHS): 22 B/MIN
MODE (OHS): AC
MONOCYTES # BLD AUTO: 1.47 X10(3)/MCL (ref 0.1–1.3)
MONOCYTES NFR BLD AUTO: 8.5 %
MUCOUS THREADS URNS QL MICRO: ABNORMAL /LPF
NEUTROPHILS # BLD AUTO: 13.79 X10(3)/MCL (ref 2.1–9.2)
NEUTROPHILS NFR BLD AUTO: 79.5 %
NITRITE UR QL STRIP: NEGATIVE
NRBC BLD AUTO-RTO: 0 %
PCO2 BLDA: 38 MMHG (ref 35–45)
PEEP RESPIRATORY: 5 CMH2O
PH BLDA: 7.45 [PH] (ref 7.35–7.45)
PH UR STRIP: 5.5 [PH]
PLATELET # BLD AUTO: 283 X10(3)/MCL (ref 130–400)
PMV BLD AUTO: 8.5 FL (ref 7.4–10.4)
PO2 BLDA: 79 MMHG (ref 80–100)
POTASSIUM BLOOD GAS (OHS): 3.3 MMOL/L (ref 3.5–5)
POTASSIUM SERPL-SCNC: 3.9 MMOL/L (ref 3.5–5.1)
PROT SERPL-MCNC: 7.7 GM/DL (ref 5.8–7.6)
PROT UR QL STRIP: ABNORMAL
RBC # BLD AUTO: 5.41 X10(6)/MCL (ref 4.7–6.1)
RBC #/AREA URNS AUTO: ABNORMAL /HPF
SAMPLE SITE BLOOD GAS (OHS): ABNORMAL
SAO2 % BLDA: 96 %
SODIUM BLOOD GAS (OHS): 139 MMOL/L (ref 137–145)
SODIUM SERPL-SCNC: 141 MMOL/L (ref 136–145)
SP GR UR STRIP.AUTO: 1.03 (ref 1–1.03)
SPONT+MECH VT ON VENT: 500 ML
SQUAMOUS #/AREA URNS LPF: ABNORMAL /HPF
UROBILINOGEN UR STRIP-ACNC: NORMAL
WBC # BLD AUTO: 17.35 X10(3)/MCL (ref 4.5–11.5)
WBC #/AREA URNS AUTO: ABNORMAL /HPF

## 2025-03-27 PROCEDURE — 85025 COMPLETE CBC W/AUTO DIFF WBC: CPT

## 2025-03-27 PROCEDURE — 63600175 PHARM REV CODE 636 W HCPCS: Mod: JZ,TB | Performed by: INTERNAL MEDICINE

## 2025-03-27 PROCEDURE — 63600175 PHARM REV CODE 636 W HCPCS: Mod: JZ,TB

## 2025-03-27 PROCEDURE — 36600 WITHDRAWAL OF ARTERIAL BLOOD: CPT

## 2025-03-27 PROCEDURE — 27100171 HC OXYGEN HIGH FLOW UP TO 24 HOURS

## 2025-03-27 PROCEDURE — 6A551Z3 PHERESIS OF PLASMA, MULTIPLE: ICD-10-PCS | Performed by: INTERNAL MEDICINE

## 2025-03-27 PROCEDURE — 85730 THROMBOPLASTIN TIME PARTIAL: CPT | Performed by: INTERNAL MEDICINE

## 2025-03-27 PROCEDURE — 94760 N-INVAS EAR/PLS OXIMETRY 1: CPT

## 2025-03-27 PROCEDURE — 82803 BLOOD GASES ANY COMBINATION: CPT

## 2025-03-27 PROCEDURE — 80053 COMPREHEN METABOLIC PANEL: CPT

## 2025-03-27 PROCEDURE — 81001 URINALYSIS AUTO W/SCOPE: CPT

## 2025-03-27 PROCEDURE — 25000003 PHARM REV CODE 250

## 2025-03-27 PROCEDURE — 99223 1ST HOSP IP/OBS HIGH 75: CPT | Mod: ,,, | Performed by: SPECIALIST

## 2025-03-27 PROCEDURE — 5A1945Z RESPIRATORY VENTILATION, 24-96 CONSECUTIVE HOURS: ICD-10-PCS | Performed by: INTERNAL MEDICINE

## 2025-03-27 PROCEDURE — 87040 BLOOD CULTURE FOR BACTERIA: CPT | Performed by: INTERNAL MEDICINE

## 2025-03-27 PROCEDURE — 25000003 PHARM REV CODE 250: Performed by: INTERNAL MEDICINE

## 2025-03-27 PROCEDURE — 63600175 PHARM REV CODE 636 W HCPCS

## 2025-03-27 PROCEDURE — P9045 ALBUMIN (HUMAN), 5%, 250 ML: HCPCS | Mod: JZ,TB | Performed by: INTERNAL MEDICINE

## 2025-03-27 PROCEDURE — 0BH17EZ INSERTION OF ENDOTRACHEAL AIRWAY INTO TRACHEA, VIA NATURAL OR ARTIFICIAL OPENING: ICD-10-PCS | Performed by: EMERGENCY MEDICINE

## 2025-03-27 PROCEDURE — 20000000 HC ICU ROOM

## 2025-03-27 PROCEDURE — 87340 HEPATITIS B SURFACE AG IA: CPT | Performed by: INTERNAL MEDICINE

## 2025-03-27 PROCEDURE — 99900035 HC TECH TIME PER 15 MIN (STAT)

## 2025-03-27 PROCEDURE — 99222 1ST HOSP IP/OBS MODERATE 55: CPT | Mod: ,,, | Performed by: INTERNAL MEDICINE

## 2025-03-27 PROCEDURE — 94002 VENT MGMT INPAT INIT DAY: CPT

## 2025-03-27 PROCEDURE — 36415 COLL VENOUS BLD VENIPUNCTURE: CPT | Performed by: INTERNAL MEDICINE

## 2025-03-27 RX ORDER — HYDRALAZINE HYDROCHLORIDE 20 MG/ML
10 INJECTION INTRAMUSCULAR; INTRAVENOUS EVERY 6 HOURS PRN
Status: DISCONTINUED | OUTPATIENT
Start: 2025-03-27 | End: 2025-04-02 | Stop reason: HOSPADM

## 2025-03-27 RX ORDER — ROCURONIUM BROMIDE 10 MG/ML
50 INJECTION, SOLUTION INTRAVENOUS ONCE
Status: COMPLETED | OUTPATIENT
Start: 2025-03-27 | End: 2025-03-27

## 2025-03-27 RX ORDER — CALCIUM GLUCONATE 20 MG/ML
1 INJECTION, SOLUTION INTRAVENOUS
Status: DISCONTINUED | OUTPATIENT
Start: 2025-03-27 | End: 2025-04-02 | Stop reason: HOSPADM

## 2025-03-27 RX ORDER — ONDANSETRON HYDROCHLORIDE 2 MG/ML
4 INJECTION, SOLUTION INTRAVENOUS EVERY 8 HOURS PRN
Status: DISCONTINUED | OUTPATIENT
Start: 2025-03-27 | End: 2025-04-02 | Stop reason: HOSPADM

## 2025-03-27 RX ORDER — SODIUM CHLORIDE 9 MG/ML
500 INJECTION, SOLUTION INTRAVENOUS
Status: COMPLETED | OUTPATIENT
Start: 2025-03-27 | End: 2025-03-27

## 2025-03-27 RX ORDER — ACETAMINOPHEN 325 MG/1
650 TABLET ORAL EVERY 4 HOURS PRN
Status: DISCONTINUED | OUTPATIENT
Start: 2025-03-27 | End: 2025-04-02 | Stop reason: HOSPADM

## 2025-03-27 RX ORDER — POTASSIUM CHLORIDE 7.45 MG/ML
40 INJECTION INTRAVENOUS
Status: DISCONTINUED | OUTPATIENT
Start: 2025-03-27 | End: 2025-04-02 | Stop reason: HOSPADM

## 2025-03-27 RX ORDER — FAMOTIDINE 10 MG/ML
20 INJECTION, SOLUTION INTRAVENOUS EVERY 12 HOURS
Status: DISCONTINUED | OUTPATIENT
Start: 2025-03-27 | End: 2025-04-02 | Stop reason: HOSPADM

## 2025-03-27 RX ORDER — FENTANYL CITRATE-0.9 % NACL/PF 10 MCG/ML
0-250 PLASTIC BAG, INJECTION (ML) INTRAVENOUS CONTINUOUS
Status: DISCONTINUED | OUTPATIENT
Start: 2025-03-27 | End: 2025-03-29

## 2025-03-27 RX ORDER — SODIUM CHLORIDE 0.9 % (FLUSH) 0.9 %
10 SYRINGE (ML) INJECTION
Status: DISCONTINUED | OUTPATIENT
Start: 2025-03-27 | End: 2025-04-02 | Stop reason: HOSPADM

## 2025-03-27 RX ORDER — CALCIUM GLUCONATE 20 MG/ML
2 INJECTION, SOLUTION INTRAVENOUS
Status: DISCONTINUED | OUTPATIENT
Start: 2025-03-27 | End: 2025-04-02 | Stop reason: HOSPADM

## 2025-03-27 RX ORDER — CHLORHEXIDINE GLUCONATE ORAL RINSE 1.2 MG/ML
15 SOLUTION DENTAL 2 TIMES DAILY
Status: DISCONTINUED | OUTPATIENT
Start: 2025-03-27 | End: 2025-04-02 | Stop reason: HOSPADM

## 2025-03-27 RX ORDER — PYRIDOSTIGMINE BROMIDE 60 MG/1
60 TABLET ORAL 3 TIMES DAILY
Status: DISCONTINUED | OUTPATIENT
Start: 2025-03-27 | End: 2025-03-27

## 2025-03-27 RX ORDER — PREDNISONE 20 MG/1
40 TABLET ORAL DAILY
Status: DISCONTINUED | OUTPATIENT
Start: 2025-03-27 | End: 2025-03-28

## 2025-03-27 RX ORDER — ENOXAPARIN SODIUM 100 MG/ML
40 INJECTION SUBCUTANEOUS EVERY 24 HOURS
Status: DISCONTINUED | OUTPATIENT
Start: 2025-03-27 | End: 2025-04-02 | Stop reason: HOSPADM

## 2025-03-27 RX ORDER — DEXAMETHASONE SODIUM PHOSPHATE 4 MG/ML
2 INJECTION, SOLUTION INTRA-ARTICULAR; INTRALESIONAL; INTRAMUSCULAR; INTRAVENOUS; SOFT TISSUE EVERY 24 HOURS
Status: DISCONTINUED | OUTPATIENT
Start: 2025-03-28 | End: 2025-03-28

## 2025-03-27 RX ORDER — PROPOFOL 10 MG/ML
0-50 INJECTION, EMULSION INTRAVENOUS CONTINUOUS
Status: DISCONTINUED | OUTPATIENT
Start: 2025-03-27 | End: 2025-03-28

## 2025-03-27 RX ORDER — CALCIUM GLUCONATE 20 MG/ML
3 INJECTION, SOLUTION INTRAVENOUS
Status: DISCONTINUED | OUTPATIENT
Start: 2025-03-27 | End: 2025-04-02 | Stop reason: HOSPADM

## 2025-03-27 RX ORDER — ETOMIDATE 2 MG/ML
20 INJECTION INTRAVENOUS ONCE
Status: COMPLETED | OUTPATIENT
Start: 2025-03-27 | End: 2025-03-27

## 2025-03-27 RX ORDER — ROCURONIUM BROMIDE 10 MG/ML
100 INJECTION, SOLUTION INTRAVENOUS ONCE
Status: DISCONTINUED | OUTPATIENT
Start: 2025-03-27 | End: 2025-03-27

## 2025-03-27 RX ORDER — ALBUMIN HUMAN 50 G/1000ML
225 SOLUTION INTRAVENOUS ONCE
Status: COMPLETED | OUTPATIENT
Start: 2025-03-27 | End: 2025-03-27

## 2025-03-27 RX ORDER — MUPIROCIN 20 MG/G
OINTMENT TOPICAL 2 TIMES DAILY
Status: DISPENSED | OUTPATIENT
Start: 2025-03-27 | End: 2025-04-01

## 2025-03-27 RX ORDER — CALCIUM GLUCONATE 20 MG/ML
3 INJECTION, SOLUTION INTRAVENOUS ONCE
Status: COMPLETED | OUTPATIENT
Start: 2025-03-27 | End: 2025-03-27

## 2025-03-27 RX ADMIN — SODIUM CHLORIDE 500 ML: 9 INJECTION, SOLUTION INTRAVENOUS at 10:03

## 2025-03-27 RX ADMIN — FAMOTIDINE 20 MG: 10 INJECTION, SOLUTION INTRAVENOUS at 09:03

## 2025-03-27 RX ADMIN — METHYLPREDNISOLONE SODIUM SUCCINATE 1000 MG: 1 INJECTION, POWDER, LYOPHILIZED, FOR SOLUTION INTRAMUSCULAR; INTRAVENOUS at 11:03

## 2025-03-27 RX ADMIN — ETOMIDATE 20 MG: 2 INJECTION INTRAVENOUS at 09:03

## 2025-03-27 RX ADMIN — Medication 50 MCG/HR: at 09:03

## 2025-03-27 RX ADMIN — ALBUMIN (HUMAN) 225 G: 12.5 SOLUTION INTRAVENOUS at 10:03

## 2025-03-27 RX ADMIN — PROPOFOL 40 MCG/KG/MIN: 10 INJECTION, EMULSION INTRAVENOUS at 11:03

## 2025-03-27 RX ADMIN — PROPOFOL 20 MCG/KG/MIN: 10 INJECTION, EMULSION INTRAVENOUS at 08:03

## 2025-03-27 RX ADMIN — ROCURONIUM BROMIDE 50 MG: 10 INJECTION, SOLUTION INTRAVENOUS at 09:03

## 2025-03-27 RX ADMIN — ENOXAPARIN SODIUM 40 MG: 40 INJECTION SUBCUTANEOUS at 05:03

## 2025-03-27 RX ADMIN — CHLORHEXIDINE GLUCONATE 0.12% ORAL RINSE 15 ML: 1.2 LIQUID ORAL at 11:03

## 2025-03-27 RX ADMIN — CALCIUM GLUCONATE 3 G: 20 INJECTION, SOLUTION INTRAVENOUS at 10:03

## 2025-03-27 RX ADMIN — HYDRALAZINE HYDROCHLORIDE 10 MG: 20 INJECTION INTRAMUSCULAR; INTRAVENOUS at 09:03

## 2025-03-27 NOTE — PLAN OF CARE
Pt is , 3 children, no living will or POA. Physical address [address] [place]. Pt was supposed to start home infusions through [facility] prior to hospital admit. Notified Sandeep HURD With [employer] who was going to coordinate with the [place] office. She asked that CM contact her when pt is ready for dc.    03/27/25 1159   Discharge Assessment   Assessment Type Discharge Planning Assessment   Confirmed/corrected address, phone number and insurance Yes   Confirmed Demographics Correct on Facesheet  (Physical [address] [place])   Source of Information patient;family   When was your last doctors appointment?   (PCP [facility] [place])   Communicated CLAUDIA with patient/caregiver Date not available/Unable to determine   People in Home spouse   Do you expect to return to your current living situation? Yes   Do you have help at home or someone to help you manage your care at home? Yes   Who are your caregiver(s) and their phone number(s)? wife Anaya Lomas [phone]   Prior to hospitilization cognitive status: Unable to Assess   Current cognitive status: Unable to Assess   Walking or Climbing Stairs Difficulty no   Dressing/Bathing Difficulty no   Home Accessibility stairs within home   Stairs, Within Home, Primary 15   Home Layout Able to live on 1st floor   Equipment Currently Used at Home none   Readmission within 30 days? No   Patient currently being followed by outpatient case management? No   Do you currently have service(s) that help you manage your care at home? Yes   Name and Contact number of agency [facility] was supposed to start today for home infusions   Is the pt/caregiver preference to resume services with current agency Yes   Do you take prescription medications? Yes  (Fills at [facility])   Do you have prescription coverage? Yes   Coverage [employer]   Do you have any problems affording any of your prescribed medications? No   Is the patient taking medications as  prescribed? yes   Who is going to help you get home at discharge? wife   How do you get to doctors appointments? car, drives self;family or friend will provide   Are you on dialysis? No   Do you take coumadin? No   Discharge Plan A Other  (TBD\)   DME Needed Upon Discharge  other (see comments)   Discharge Plan discussed with: Patient;Spouse/sig other   Name(s) and Number(s) Anaya Lomas [phone]   Transition of Care Barriers None   Physical Activity   On average, how many days per week do you engage in moderate to strenuous exercise (like a brisk walk)? 0 days  (no formal exercise but active per wife)   On average, how many minutes do you engage in exercise at this level? 0 min   Financial Resource Strain   How hard is it for you to pay for the very basics like food, housing, medical care, and heating? Not very   Housing Stability   In the last 12 months, was there a time when you were not able to pay the mortgage or rent on time? N   At any time in the past 12 months, were you homeless or living in a shelter (including now)? N   Transportation Needs   In the past 12 months, has lack of transportation kept you from medical appointments or from getting medications? no   In the past 12 months, has lack of transportation kept you from meetings, work, or from getting things needed for daily living? No   Food Insecurity   Within the past 12 months, you worried that your food would run out before you got the money to buy more. Never true   Within the past 12 months, the food you bought just didn't last and you didn't have money to get more. Never true   Stress   Do you feel stress - tense, restless, nervous, or anxious, or unable to sleep at night because your mind is troubled all the time - these days? To some exte  (due to medical, has PRN anxiety meds)   Social Isolation   How often do you feel lonely or isolated from those around you?  Never   Alcohol Use   Q1: How often do you have a drink containing alcohol? 2-3  per wk  (Last drink 2-3 months ago)   Q2: How many drinks containing alcohol do you have on a typical day when you are drinking? 3 or 4   Q3: How often do you have six or more drinks on one occasion? Less than mo   Utilities   In the past 12 months has the electric, gas, oil, or water company threatened to shut off services in your home? No   Health Literacy   How often do you need to have someone help you when you read instructions, pamphlets, or other written material from your doctor or pharmacy? Rarely   OTHER   Name(s) of People in Home wife Anaya Lomas

## 2025-03-27 NOTE — CONSULTS
[facility] New Consult Note    Patient Name: Vignesh Lomas  Admission Date: 3/27/2025  Hospital Length of Stay: 0 days  Code Status: No Order   Attending Physician: Chito Nolan MD   Primary Care Physician: Grey Guerrero FNP  Principal Problem:<principal problem not specified>    ON CALL NEPHROLOGY CONSULT    HPI:    The patient is a 64 yo M with pmh of MG currently on physostigmine and prednisone therapy that presents to the ED for acute weakness, increased secretions, and trouble swallowing. Wife states symptoms started yesterday. Became progressively weak over the course of the day. Now having trouble swallowing, trouble with speech, and increased secretions.     Pt sees Dr Alonso who recommends admission and plasma exchange  We are called for consult for plasmapheresis    Medical History:   Past Medical History:   Diagnosis Date    Gout, unspecified    myasthenia    Surgical History:   Past Surgical History:   Procedure Laterality Date    FOOT SURGERY Left     02/08/2023 achilles tendon    HERNIA REPAIR      HERNIA REPAIR      SKIN CANCER EXCISION         Family History:   No family history on file..     Social History:   Social History     Tobacco Use    Smoking status: Never    Smokeless tobacco: Never   Substance Use Topics    Alcohol use: Not Currently     Comment: beer/wine 1-2 times per week       Allergies:  Review of patient's allergies indicates:  No Known Allergies      Review of Systems:  Skin: Denies wounds, rashes, no skin lesions or itching  EENT: Denies acute hearing/vision changes, tinnitus, rhinorrhea or dysphagia  Respiratory:  problems with secretions  Cardiovascular: Denies chest pain, palpitations, or swelling  Gastrointestional: Denies abdominal pain, nausea, vomiting, diarrhea or constipation  Genitourinary: Denies dysuria, hematuria, foamy urine or incontinence; able to empty bladder    Neurological: difficult speech , upper ext weakness  Hematological: Denies unusual bruising  "or bleeding      Medications:  Current Medications[1]     Scheduled Meds:   pyridostigmine  60 mg Oral TID     Continuous Infusions:      Objective:  BP (!) 145/68   Pulse 71   Temp 97.8 °F (36.6 °C) (Oral)   Resp 17   Ht 5' 10" (1.778 m)   Wt 122.5 kg (270 lb)   SpO2 (!) 94%   BMI 38.74 kg/m²  Body mass index is 38.74 kg/m².    No intake/output data recorded.  No intake/output data recorded.        Physical Exam:  General: no acute distress, awake, alert  Eyes: PERRLA, EOMI, conjunctiva clear, eyelids without swelling  HENT: atraumatic, oropharynx and nasal mucosa patent, no difficulty hearing  Neck: full ROM, no JVD, no thyromegaly or lymphadenopathy  Respiratory: equal, rhonchi  Cardiovascular: RRR without murmur or rub; BL radial and pedal pulses felt  Edema: none  Gastrointestinal: soft, non-tender, non-distended; positive bowel sounds; no masses to palpation    Musculoskeletal: limited mvt upper ext  Integumentary: warm, dry; no rashes, wounds, or skin lesions  Neurological: difficult speech      Labs:  Chemistries:   Recent Labs   Lab 03/27/25  1034      K 3.9      CO2 26   BUN 18.2   CREATININE 0.79   CALCIUM 9.8   BILITOT 3.0*   ALKPHOS 80   ALT 23   AST 16   GLUCOSE 104        CBC/Anemia Labs: Coags:    Recent Labs   Lab 03/27/25  1034   WBC 17.35*   HGB 16.2   HCT 48.2      MCV 89.1   RDW 13.0    Recent Labs   Lab 03/27/25  1034   APTT 29.0          Impression:    Myasthenia  Plasma exchange recommended per Neuor      Plan:     Will pherese today 1.5 plasma volume and replace with albumin  Will likely need 5 tx total ( depending on neuro recommendations)  Will do as permitted by sharda Peters      Thank you for this consult.        [1]   Current Facility-Administered Medications:     pyridostigmine tablet 60 mg, 60 mg, Oral, TID, Chito Nolan MD    Current Outpatient Medications:     ALPRAZolam (XANAX) 0.5 MG tablet, Take 0.5 mg by mouth 2 (two) times daily as " needed., Disp: , Rfl:     azelastine (ASTELIN) 137 mcg (0.1 %) nasal spray, 2 sprays by Nasal route nightly as needed., Disp: , Rfl:     predniSONE (DELTASONE) 10 MG tablet, Take 3 tablets (30 mg total) by mouth once daily., Disp: 90 tablet, Rfl: 5    pyridostigmine (MESTINON) 180 mg TbSR, Take 1 tablet (180 mg total) by mouth 3 (three) times daily., Disp: 90 tablet, Rfl: 5    pyridostigmine (MESTINON) 60 mg Tab, Take 60 mg by mouth 4 (four) times daily., Disp: , Rfl:     zolpidem (AMBIEN) 10 mg Tab, Take 10 mg by mouth nightly as needed., Disp: , Rfl:

## 2025-03-27 NOTE — ED PROVIDER NOTES
Encounter Date: 3/27/2025       History     Chief Complaint   Patient presents with    Dysphagia     Pt ambulatory to triage and presents via POV. Hx myasthenia Gravis and endorses inability to swallow since yesterday. Pt is also unable to speak d/t excess secretions according to spouse. Pt has has a cough noted in triage that wife states is not new and is not related to swallowing issue. Pt o2 originally 94% RA but dropped to 88% RA while in triage. Pt placed on 2L NC and placed in wheelchair.     The patient is a 64 yo M with pmh of MG currently on physostigmine and prednisone therapy that presents to the ED for acute weakness, increased secretions, and trouble swallowing. Wife states symptoms started yesterday. Became progressively weak over the course of the day. Now having trouble swallowing, trouble with speech, and increased secretions. Unable to take physostigmine last night and this morning. Able to take prednisone this AM. Having wet cough, unable to produce sputum. Patient planning to start IVIG therapy for MG tomorrow. Contacted neurologist, Dr. Alonso, recommended presenting to ED for admission. Denies any CP or increased work of breathing. No fever/chills.         Review of patient's allergies indicates:  No Known Allergies  Past Medical History:   Diagnosis Date    Gout, unspecified      Past Surgical History:   Procedure Laterality Date    FOOT SURGERY Left     02/08/2023 achilles tendon    HERNIA REPAIR      HERNIA REPAIR      SKIN CANCER EXCISION       No family history on file.  Social History[1]  Review of Systems   Constitutional:  Negative for activity change, appetite change, chills, diaphoresis and fever.   HENT:  Positive for trouble swallowing and voice change. Negative for congestion, ear pain, rhinorrhea and sore throat.    Respiratory:  Positive for cough. Negative for apnea and shortness of breath.    Cardiovascular:  Negative for chest pain, palpitations and leg swelling.    Gastrointestinal:  Negative for abdominal pain, diarrhea and vomiting.   Genitourinary:  Negative for decreased urine volume.   Musculoskeletal:  Negative for gait problem.   Skin:  Negative for rash.   Neurological:  Positive for weakness. Negative for dizziness, syncope, light-headedness and headaches.       Physical Exam     Initial Vitals [03/27/25 1012]   BP Pulse Resp Temp SpO2   138/87 88 18 97.8 °F (36.6 °C) (!) 94 %      MAP       --         Physical Exam    Vitals reviewed.  Constitutional: He appears well-developed and well-nourished. He is not diaphoretic. He is active and cooperative.  Non-toxic appearance. He does not have a sickly appearance. He does not appear ill. No distress.   HENT:   Head: Normocephalic.   Right Ear: External ear normal.   Left Ear: External ear normal.   Nose: Nose normal. Mouth/Throat: Oropharynx is clear and moist and mucous membranes are normal.   Eyes: Conjunctivae are normal.   Neck:   Normal range of motion.  Cardiovascular:  Normal rate, regular rhythm and normal heart sounds.     Exam reveals no gallop, no distant heart sounds and no friction rub.       No murmur heard.  Pulmonary/Chest: Breath sounds normal. No accessory muscle usage. No apnea and no tachypnea. He has no decreased breath sounds. He has no wheezes. He has no rhonchi. He has no rales.   Transmitted upper airway sounds   Abdominal: Abdomen is soft. Bowel sounds are normal. He exhibits no distension. There is no abdominal tenderness. There is no rebound and no guarding.   Musculoskeletal:         General: Normal range of motion.      Cervical back: Normal range of motion.     Neurological: He is alert.   Skin: Skin is warm. Capillary refill takes less than 2 seconds. No rash noted. No erythema.       ED Course   Procedures  Labs Reviewed   COMPREHENSIVE METABOLIC PANEL - Abnormal       Result Value    Sodium 141      Potassium 3.9      Chloride 105      CO2 26      Glucose 104      Blood Urea Nitrogen  18.2      Creatinine 0.79      Calcium 9.8      Protein Total 7.7 (*)     Albumin 4.1      Globulin 3.6 (*)     Albumin/Globulin Ratio 1.1      Bilirubin Total 3.0 (*)     ALP 80      ALT 23      AST 16      eGFR >60      Anion Gap 10.0      BUN/Creatinine Ratio 23     URINALYSIS, REFLEX TO URINE CULTURE - Abnormal    Color, UA Yellow      Appearance, UA Clear      Specific Gravity, UA 1.033 (*)     pH, UA 5.5      Protein, UA 1+ (*)     Glucose, UA Normal      Ketones, UA 1+ (*)     Blood, UA Negative      Bilirubin, UA Negative      Urobilinogen, UA Normal      Nitrites, UA Negative      Leukocyte Esterase, UA Negative      RBC, UA 0-5      WBC, UA 0-5      Bacteria, UA None Seen      Squamous Epithelial Cells, UA None Seen      Mucous, UA Many (*)    CBC WITH DIFFERENTIAL - Abnormal    WBC 17.35 (*)     RBC 5.41      Hgb 16.2      Hct 48.2      MCV 89.1      MCH 29.9      MCHC 33.6      RDW 13.0      Platelet 283      MPV 8.5      Neut % 79.5      Lymph % 10.3      Mono % 8.5      Eos % 1.0      Basophil % 0.2      Imm Grans % 0.5      Neut # 13.79 (*)     Lymph # 1.79      Mono # 1.47 (*)     Eos # 0.18      Baso # 0.04      Imm Gran # 0.08 (*)     NRBC% 0.0     APTT - Normal    PTT 29.0     CBC W/ AUTO DIFFERENTIAL    Narrative:     The following orders were created for panel order CBC auto differential.  Procedure                               Abnormality         Status                     ---------                               -----------         ------                     CBC with Differential[[id number]]       Abnormal            Final result                 Please view results for these tests on the individual orders.          Imaging Results              X-Ray Chest PA And Lateral (Final result)  Result time 03/27/25 10:23:27      Final result by Crispin Whatley MD (03/27/25 10:23:27)                   Impression:      No acute chest disease is identified.      Electronically signed by: Crispin  Chacorta  Date:    03/27/2025  Time:    10:23               Narrative:    EXAMINATION:  XR CHEST PA AND LATERAL    CLINICAL HISTORY:  , Dysphagia, unspecified.    FINDINGS:  No alveolar consolidation, effusion, or pneumothorax is seen.   The thoracic aorta is normal  cardiac silhouette, central pulmonary vessels and mediastinum are normal in size and are grossly unremarkable.   visualized osseous structures are grossly unremarkable.                                       Medications   pyridostigmine tablet 60 mg (60 mg Oral Not Given 3/27/25 1500)   predniSONE tablet 40 mg (40 mg Oral Not Given 3/27/25 1400)   dexAMETHasone injection 2 mg (has no administration in time range)   sodium chloride 0.9% flush 10 mL (has no administration in time range)   enoxaparin injection 40 mg (has no administration in time range)   acetaminophen tablet 650 mg (has no administration in time range)   potassium chloride 10 mEq in 100 mL IVPB (has no administration in time range)   sodium phosphate 15 mmol in D5W 250 mL IVPB (has no administration in time range)   sodium phosphate 20.1 mmol in D5W 250 mL IVPB (has no administration in time range)   sodium phosphate 30 mmol in D5W 250 mL IVPB (has no administration in time range)   calcium gluconate 1 g in NS IVPB (premixed) (has no administration in time range)   calcium gluconate 1 g in NS IVPB (premixed) (has no administration in time range)   calcium gluconate 1 g in NS IVPB (premixed) (has no administration in time range)   famotidine (PF) injection 20 mg (has no administration in time range)   ondansetron injection 4 mg (has no administration in time range)   0.9% NaCl infusion (0 mLs Intravenous Stopped 3/27/25 1145)     Medical Decision Making  Patient presents with complaint of progressive weakness, trouble swallowing, slurred speech, and increased secretions. Concerning for acute MG crisis. Neurology consulted, planning for exchange transfusion. Recommended admission to ICU  for close monitoring in light of possible progression and need for airway protection. ICU consulted and in agreement, will admit patient. General surgery also consulted for placement of HD line for plasma exchange.     Amount and/or Complexity of Data Reviewed  Independent Historian: spouse     Details: Wife  Labs: ordered.     Details: WBC elevated to 17.35, CMP unremarkable.   Radiology: ordered.     Details: CXR no acute cardiopulmonary process.    Risk  Prescription drug management.  Decision regarding hospitalization.      Additional MDM:   Differential Diagnosis:   Other: The following diagnoses were also considered and will be evaluated: MG crisis, Aspiration pneumonia and Dehydration.                                   Clinical Impression:  Final diagnoses:  [R13.10] Difficulty swallowing  [G70.01] Myasthenic crisis          ED Disposition Condition    Admit                     [1]   Social History  Tobacco Use    Smoking status: Never    Smokeless tobacco: Never   Substance Use Topics    Alcohol use: Not Currently     Comment: beer/wine 1-2 times per week    Drug use: Never        Declan Ludwig MD  Resident  03/27/25 7913

## 2025-03-27 NOTE — FIRST PROVIDER EVALUATION
"Medical screening examination initiated.  I have conducted a focused provider triage encounter, findings are as follows:    Brief history of present illness:  arrived to ED due to difficulty swallowing and talking. Hx of Myasthenia Gravis. This has been ongoing for several months but worsened three days ago. Neurologist: Dr. Alonso     Vitals:    03/27/25 1012   BP: 138/87   Pulse: 88   Resp: 18   Temp: 97.8 °F (36.6 °C)   TempSrc: Oral   SpO2: (!) 94%   Weight: 122.5 kg (270 lb)   Height: 5' 10" (1.778 m)       Pertinent physical exam:  awake, alert, has non-labored breathing, ambulatory into triage    Brief workup plan:  labs, imaging     Preliminary workup initiated; this workup will be continued and followed by the physician or advanced practice provider that is assigned to the patient when roomed.  "

## 2025-03-27 NOTE — Clinical Note
Diagnosis: Difficulty swallowing [403422]   Admit to which facility:: [facility] [[id number]]   Reason for IP Medical Treatment  (Clinical interventions that can only be accomplished in the IP setting? ) :: MG crisis requiring plasma exchange

## 2025-03-27 NOTE — H&P
[facility] - Emergency Dept  Pulmonary Critical Care Note    Patient Name: Vignesh Lomas  MRN: [id number]  Admission Date: 3/27/2025  Hospital Length of Stay: 0 days  Code Status: Full Code  Attending Provider: No att. providers found  Primary Care Provider: Grey Guerrero FNP     Subjective:     HPI:   Patient is a 65-year-old male with a past medical history significant for myasthenia gravis currently on fingerstick mean and prednisone therapy.  History and ROS somewhat given by wife at bedside due to patient's difficulty with talking.  Patient and wife report that he was diagnosed with myasthenia gravis 2 years ago and has been on visits stick main and prednisone for the past 6 months.  Yesterday, patient was having difficulty swallowing.  He could not swallow his medicines as directed or water/food.  Patient presented to the ED at [facility] this morning with complaints of difficulty swallowing, trouble controlling his secretions, difficulty speaking.  He was not able to take his visiting me last night or this morning.  Was able to take his prednisone this morning.  Patient is having a wet cough, unable to control secretions/sputum.  Plan for patient was to start IVIG tomorrow.  Patient was examined in the ED.  Patient was resting in bed in no acute distress.  Patient had difficulty talking we are controlling secretions.  Patient was able to better control his secretions with wall suction.  Patient was able to ambulate and breathe without difficulty.  Patient denies any increased work of breath were short windedness.  Chest x-ray findings were unremarkable.  Some coarse rhonchi were noted centrally, bilaterally.  Patient was saturating low to mid 90s on 2 L nasal cannula.  CBC, CMP unremarkable.  Neurology, Nephrology have been consulted.  Plan is for plasmapheresis inpatient and placement of plasmapheresis catheter.  Due to decompensating myasthenia gravis requiring plasmapheresis, we will  admit patient to ICU.    Hospital Course/Significant events:  3/27/2025 - plan for plasmapheresis catheter and plasmapheresis    24 Hour Interval History:  As above    Past Medical History:   Diagnosis Date    Gout, unspecified        Past Surgical History:   Procedure Laterality Date    FOOT SURGERY Left     02/08/2023 achilles tendon    HERNIA REPAIR      HERNIA REPAIR      SKIN CANCER EXCISION         Social History[1]        Current Outpatient Medications   Medication Instructions    ALPRAZolam (XANAX) 0.5 mg, 2 times daily PRN    azelastine (ASTELIN) 137 mcg (0.1 %) nasal spray 2 sprays, Nightly PRN    predniSONE (DELTASONE) 30 mg, Oral, Daily    pyridostigmine (MESTINON) 180 mg, Oral, 3 times daily    pyridostigmine (MESTINON) 120 mg, 4 times daily    zolpidem (AMBIEN) 10 mg, Nightly PRN       Review of patient's allergies indicates:  No Known Allergies     Current Inpatient Medications   [START ON 3/28/2025] dexAMETHasone injection  2 mg Intravenous Daily    enoxparin  40 mg Subcutaneous Daily    famotidine (PF)  20 mg Intravenous Q12H    predniSONE  40 mg Oral Daily    pyridostigmine  60 mg Oral TID       Current Intravenous Infusions        Review of Systems   Constitutional:  Negative for chills and fever.   HENT:  Negative for ear pain and hearing loss.         Increasing difficulty with swallowing, unable to manipulate or extend tongue.   Eyes:  Negative for pain and redness.   Respiratory:  Positive for sputum production. Negative for cough and shortness of breath.         Increased sputum due to inability to swallow/control   Cardiovascular:  Negative for chest pain and palpitations.   Gastrointestinal:  Negative for constipation, diarrhea, nausea and vomiting.   Genitourinary:  Negative for dysuria and hematuria.   Musculoskeletal:  Negative for myalgias.   Skin:  Negative for rash.   Neurological:  Positive for speech change. Negative for seizures, weakness and headaches.   Psychiatric/Behavioral:   Negative for depression.           Objective:       Intake/Output Summary (Last 24 hours) at 3/27/2025 1552  Last data filed at 3/27/2025 1527  Gross per 24 hour   Intake --   Output 262 ml   Net -262 ml         Vital Signs (Most Recent):  Temp: 97.8 °F (36.6 °C) (03/27/25 1012)  Pulse: 75 (03/27/25 1519)  Resp: 17 (03/27/25 1519)  BP: (!) 152/77 (03/27/25 1519)  SpO2: (!) 91 % (03/27/25 1519)  Body mass index is 38.74 kg/m².  Weight: 122.5 kg (270 lb) Vital Signs (24h Range):  Temp:  [97.8 °F (36.6 °C)] 97.8 °F (36.6 °C)  Pulse:  [70-88] 75  Resp:  [16-20] 17  SpO2:  [91 %-96 %] 91 %  BP: (117-152)/(68-89) 152/77     Physical Exam  Constitutional:       Appearance: Normal appearance. He is not ill-appearing.   HENT:      Head: Normocephalic.      Nose: Nose normal.      Mouth/Throat:      Mouth: Mucous membranes are moist.      Comments: Patient is not able to extend tongue.  Eyes:      Pupils: Pupils are equal, round, and reactive to light.   Cardiovascular:      Rate and Rhythm: Normal rate and regular rhythm.      Heart sounds: No murmur heard.  Pulmonary:      Effort: Pulmonary effort is normal. No respiratory distress.      Breath sounds: Rhonchi present. No wheezing.      Comments: Patient was not positive for increased work of breath.  Chest x-ray was negative.  Increase coarse, rhonchi type breath sounds noted centrally, bilaterally.  Abdominal:      General: Abdomen is flat.      Palpations: Abdomen is soft.   Musculoskeletal:         General: No swelling. Normal range of motion.      Cervical back: Normal range of motion.   Skin:     Coloration: Skin is not jaundiced.   Neurological:      Mental Status: He is alert.      Comments: As above, dysphagia, dysarthria.  Inability to move tongue.  And no difficulties ambulating.           Lines/Drains/Airways       Peripheral Intravenous Line  Duration                  Peripheral IV - Single Lumen 03/27/25 1039 20 G Anterior;Left Hand <1 day         Peripheral  "IV - Single Lumen 03/27/25 1525 20 G Anterior;Right Forearm <1 day                    Significant Labs:    Lab Results   Component Value Date    WBC 17.35 (H) 03/27/2025    HGB 16.2 03/27/2025    HCT 48.2 03/27/2025    MCV 89.1 03/27/2025     03/27/2025           BMP  Lab Results   Component Value Date     03/27/2025    K 3.9 03/27/2025    CO2 26 03/27/2025    BUN 18.2 03/27/2025    CREATININE 0.79 03/27/2025    CALCIUM 9.8 03/27/2025    AGAP 10.0 03/27/2025         ABG  No results for input(s): "PH", "PO2", "PCO2", "HCO3", "POCBASEDEF" in the last 168 hours.    Mechanical Ventilation Support:  Not needed at this time.         Significant Imaging:  I have reviewed the pertinent imaging within the past 24 hours.          Assessment/Plan:     Assessment  Decompensated myasthenia gravis with dysarthria and dysphagia  Myasthenia gravis requiring plasmapheresis it is inpatient.  Leukocytosis      Plan  1.  Patient is experiencing worsening myasthenia gravis symptoms and is unable to swallow as well as control his secretions.    2.  Will admit to ICU at this time.   3.  Plan is for plasmapheresis catheter placement and plasmapheresis per Neurology guidelines.    4.  We will continue to monitor patient and offer airway support if needed.    5.  At this time, patient to remain NPO.    6.  Patient's leukocytosis likely secondary to prednisone.    7.  We will continue to monitor      DVT Prophylaxis:  Lovenox 40 mg   GI Prophylaxis:  Famotidine 20 mg b.i.d.     32 minutes of critical care was time spent personally by me on the following activities: development of treatment plan with patient or surrogate and bedside caregivers, discussions with consultants, evaluation of patient's response to treatment, examination of patient, ordering and performing treatments and interventions, ordering and review of laboratory studies, ordering and review of radiographic studies, pulse oximetry, re-evaluation of patient's " condition.  This critical care time did not overlap with that of any other provider or involve time for any procedures.     Vignesh Oneil DO  Pulmonary Critical Care Medicine  [facility] - Emergency Dept  DOS: 03/27/2025          [1]   Social History  Socioeconomic History    Marital status:    Tobacco Use    Smoking status: Never    Smokeless tobacco: Never   Substance and Sexual Activity    Alcohol use: Not Currently     Comment: beer/wine 1-2 times per week    Drug use: Never     Social Drivers of Health     Financial Resource Strain: Low Risk  (3/27/2025)    Overall Financial Resource Strain (CARDIA)     Difficulty of Paying Living Expenses: Not very hard   Food Insecurity: No Food Insecurity (3/27/2025)    Hunger Vital Sign     Worried About Running Out of Food in the Last Year: Never true     Ran Out of Food in the Last Year: Never true   Transportation Needs: No Transportation Needs (3/27/2025)    PRAPARE - Transportation     Lack of Transportation (Medical): No     Lack of Transportation (Non-Medical): No   Physical Activity: Inactive (3/27/2025)    Exercise Vital Sign     Days of Exercise per Week: 0 days     Minutes of Exercise per Session: 0 min   Stress: Stress Concern Present (3/27/2025)    [employer] [place] [employer] - Occupational Stress Questionnaire     Feeling of Stress : To some extent   Housing Stability: Low Risk  (3/27/2025)    Housing Stability Vital Sign     Unable to Pay for Housing in the Last Year: No     Homeless in the Last Year: No

## 2025-03-27 NOTE — CONSULTS
"Decompensating myasthenic with trouble swallowing secretions this am  Decr appetite and also troubkle swalloiwng in recent days to weeks   Has lost 40#  On prednisone 30mg lately   Couldn't swallow his pyridostigmine this am     Was about to start outpt IVIg  in fact has fridge full of IVIg for 6 mos just received     Other data:   Diabetic    Imuran in past w se's     ...  Exam:   BP (!) 145/68   Pulse 71   Temp 97.8 °F (36.6 °C) (Oral)   Resp 17   Ht 5' 10" (1.778 m)   Wt 122.5 kg (270 lb)   SpO2 (!) 94%   BMI 38.74 kg/m²   Wife in room w him   Alert   Cannot really speak due to dysarthria   Cannot puff cheeks or put tongue in cheek   Eyes ok   In no distress   Moves extr's well     Discussed w ER MD prior to and after seeing him     Labs: wbc 17  Chem's ok     Labs remote past:    Blocking 76; Modulating 74; Binding 3    Imagin.23 MRI brain w/ and w/o [facility]: Reportedly ok     dexAMETHas  inj  2 mg Intravenous Daily until petra po     predniSONE  40 mg Oral Daily once petra po     pyridostigmine  60 mg Oral TID if tolerates po         Prob list:   Myasthenia Gravis decompensation with dysarthria dysphagia primarily   ? Occult infection given wbc elev or is this just from him being on prednisone?   Defer further workup to hospitalist     Plan/Rec's  5 PLEX treatments please   Thanks to hospitalist nephrologist and proceduralist for helping to get this accomplished for him     Once PLEX done he can take his IVIg at home   But ideally he'd be on a complement inhibitor medication long term in my view / we will arrange long after discharge on this issue     "

## 2025-03-28 LAB
ALBUMIN SERPL-MCNC: 5 G/DL (ref 3.4–4.8)
ALBUMIN/GLOB SERPL: 3.8 RATIO (ref 1.1–2)
ALP SERPL-CCNC: 29 UNIT/L (ref 40–150)
ALT SERPL-CCNC: 9 UNIT/L (ref 0–55)
ANION GAP SERPL CALC-SCNC: 12 MEQ/L
AST SERPL-CCNC: 9 UNIT/L (ref 11–45)
BASOPHILS # BLD AUTO: 0.02 X10(3)/MCL
BASOPHILS NFR BLD AUTO: 0.2 %
BILIRUB SERPL-MCNC: 3.8 MG/DL
BUN SERPL-MCNC: 18.6 MG/DL (ref 8.4–25.7)
CALCIUM SERPL-MCNC: 8.8 MG/DL (ref 8.8–10)
CHLORIDE SERPL-SCNC: 109 MMOL/L (ref 98–107)
CO2 SERPL-SCNC: 18 MMOL/L (ref 23–31)
CREAT SERPL-MCNC: 0.76 MG/DL (ref 0.72–1.25)
CREAT/UREA NIT SERPL: 24
EOSINOPHIL # BLD AUTO: 0.02 X10(3)/MCL (ref 0–0.9)
EOSINOPHIL NFR BLD AUTO: 0.2 %
ERYTHROCYTE [DISTWIDTH] IN BLOOD BY AUTOMATED COUNT: 13 % (ref 11.5–17)
FIBRINOGEN PPP-MCNC: 245 MG/DL (ref 210–463)
GFR SERPLBLD CREATININE-BSD FMLA CKD-EPI: >60 ML/MIN/1.73/M2
GLOBULIN SER-MCNC: 1.3 GM/DL (ref 2.4–3.5)
GLUCOSE SERPL-MCNC: 108 MG/DL (ref 82–115)
HCT VFR BLD AUTO: 39.8 % (ref 42–52)
HGB BLD-MCNC: 13.1 G/DL (ref 14–18)
IMM GRANULOCYTES # BLD AUTO: 0.02 X10(3)/MCL (ref 0–0.04)
IMM GRANULOCYTES NFR BLD AUTO: 0.2 %
INR PPP: 1.5
LYMPHOCYTES # BLD AUTO: 0.78 X10(3)/MCL (ref 0.6–4.6)
LYMPHOCYTES NFR BLD AUTO: 9.5 %
MCH RBC QN AUTO: 29.6 PG (ref 27–31)
MCHC RBC AUTO-ENTMCNC: 32.9 G/DL (ref 33–36)
MCV RBC AUTO: 89.8 FL (ref 80–94)
MONOCYTES # BLD AUTO: 0.21 X10(3)/MCL (ref 0.1–1.3)
MONOCYTES NFR BLD AUTO: 2.5 %
NEUTROPHILS # BLD AUTO: 7.19 X10(3)/MCL (ref 2.1–9.2)
NEUTROPHILS NFR BLD AUTO: 87.4 %
NRBC BLD AUTO-RTO: 0 %
PLATELET # BLD AUTO: 220 X10(3)/MCL (ref 130–400)
PMV BLD AUTO: 8.8 FL (ref 7.4–10.4)
POTASSIUM SERPL-SCNC: 3.9 MMOL/L (ref 3.5–5.1)
PROT SERPL-MCNC: 6.3 GM/DL (ref 5.8–7.6)
PROTHROMBIN TIME: 17.7 SECONDS (ref 12.5–14.5)
RBC # BLD AUTO: 4.43 X10(6)/MCL (ref 4.7–6.1)
SODIUM SERPL-SCNC: 139 MMOL/L (ref 136–145)
WBC # BLD AUTO: 8.24 X10(3)/MCL (ref 4.5–11.5)

## 2025-03-28 PROCEDURE — 94003 VENT MGMT INPAT SUBQ DAY: CPT

## 2025-03-28 PROCEDURE — 25000003 PHARM REV CODE 250

## 2025-03-28 PROCEDURE — 25000003 PHARM REV CODE 250: Performed by: INTERNAL MEDICINE

## 2025-03-28 PROCEDURE — 99900031 HC PATIENT EDUCATION (STAT)

## 2025-03-28 PROCEDURE — 63600175 PHARM REV CODE 636 W HCPCS

## 2025-03-28 PROCEDURE — 20000000 HC ICU ROOM

## 2025-03-28 PROCEDURE — 99900026 HC AIRWAY MAINTENANCE (STAT)

## 2025-03-28 PROCEDURE — 80053 COMPREHEN METABOLIC PANEL: CPT | Performed by: INTERNAL MEDICINE

## 2025-03-28 PROCEDURE — 27200966 HC CLOSED SUCTION SYSTEM

## 2025-03-28 PROCEDURE — 85610 PROTHROMBIN TIME: CPT | Performed by: INTERNAL MEDICINE

## 2025-03-28 PROCEDURE — 94761 N-INVAS EAR/PLS OXIMETRY MLT: CPT

## 2025-03-28 PROCEDURE — 63600175 PHARM REV CODE 636 W HCPCS: Mod: JZ,TB | Performed by: INTERNAL MEDICINE

## 2025-03-28 PROCEDURE — 85384 FIBRINOGEN ACTIVITY: CPT | Performed by: INTERNAL MEDICINE

## 2025-03-28 PROCEDURE — 94760 N-INVAS EAR/PLS OXIMETRY 1: CPT

## 2025-03-28 PROCEDURE — 36514 APHERESIS PLASMA: CPT

## 2025-03-28 PROCEDURE — 99232 SBSQ HOSP IP/OBS MODERATE 35: CPT | Mod: ,,, | Performed by: INTERNAL MEDICINE

## 2025-03-28 PROCEDURE — 85025 COMPLETE CBC W/AUTO DIFF WBC: CPT | Performed by: INTERNAL MEDICINE

## 2025-03-28 PROCEDURE — 99231 SBSQ HOSP IP/OBS SF/LOW 25: CPT | Mod: ,,, | Performed by: SPECIALIST

## 2025-03-28 PROCEDURE — 27100171 HC OXYGEN HIGH FLOW UP TO 24 HOURS

## 2025-03-28 PROCEDURE — 99900035 HC TECH TIME PER 15 MIN (STAT)

## 2025-03-28 PROCEDURE — 36415 COLL VENOUS BLD VENIPUNCTURE: CPT | Performed by: INTERNAL MEDICINE

## 2025-03-28 PROCEDURE — 63600175 PHARM REV CODE 636 W HCPCS: Performed by: SPECIALIST

## 2025-03-28 PROCEDURE — P9045 ALBUMIN (HUMAN), 5%, 250 ML: HCPCS | Mod: JZ,TB | Performed by: INTERNAL MEDICINE

## 2025-03-28 RX ORDER — DEXAMETHASONE SODIUM PHOSPHATE 4 MG/ML
2 INJECTION, SOLUTION INTRA-ARTICULAR; INTRALESIONAL; INTRAMUSCULAR; INTRAVENOUS; SOFT TISSUE DAILY
Status: DISCONTINUED | OUTPATIENT
Start: 2025-03-29 | End: 2025-03-31

## 2025-03-28 RX ORDER — DEXMEDETOMIDINE HYDROCHLORIDE 4 UG/ML
0-1.4 INJECTION, SOLUTION INTRAVENOUS CONTINUOUS
Status: DISCONTINUED | OUTPATIENT
Start: 2025-03-28 | End: 2025-03-29

## 2025-03-28 RX ORDER — ALBUMIN HUMAN 50 G/1000ML
250 SOLUTION INTRAVENOUS ONCE
Status: COMPLETED | OUTPATIENT
Start: 2025-03-28 | End: 2025-03-28

## 2025-03-28 RX ADMIN — DEXAMETHASONE SODIUM PHOSPHATE 2 MG: 4 INJECTION, SOLUTION INTRA-ARTICULAR; INTRALESIONAL; INTRAMUSCULAR; INTRAVENOUS; SOFT TISSUE at 08:03

## 2025-03-28 RX ADMIN — MUPIROCIN: 20 OINTMENT TOPICAL at 08:03

## 2025-03-28 RX ADMIN — CHLORHEXIDINE GLUCONATE 0.12% ORAL RINSE 15 ML: 1.2 LIQUID ORAL at 08:03

## 2025-03-28 RX ADMIN — ALBUMIN (HUMAN) 250 G: 12.5 INJECTION, SOLUTION INTRAVENOUS at 02:03

## 2025-03-28 RX ADMIN — CALCIUM GLUCONATE 3 G: 20 INJECTION, SOLUTION INTRAVENOUS at 02:03

## 2025-03-28 RX ADMIN — PROPOFOL 25 MCG/KG/MIN: 10 INJECTION, EMULSION INTRAVENOUS at 02:03

## 2025-03-28 RX ADMIN — PYRIDOSTIGMINE BROMIDE 1 MG: 5 INJECTION, SOLUTION INTRAVENOUS; PARENTERAL at 10:03

## 2025-03-28 RX ADMIN — FAMOTIDINE 20 MG: 10 INJECTION, SOLUTION INTRAVENOUS at 08:03

## 2025-03-28 RX ADMIN — ENOXAPARIN SODIUM 40 MG: 40 INJECTION SUBCUTANEOUS at 04:03

## 2025-03-28 RX ADMIN — PROPOFOL 20 MCG/KG/MIN: 10 INJECTION, EMULSION INTRAVENOUS at 09:03

## 2025-03-28 RX ADMIN — Medication 25 MCG/HR: at 04:03

## 2025-03-28 RX ADMIN — DEXMEDETOMIDINE HYDROCHLORIDE 0.2 MCG/KG/HR: 400 INJECTION INTRAVENOUS at 02:03

## 2025-03-28 NOTE — NURSING
03/27/25 2338   Machine Check   Completed? Yes   Machine Serial Number [id number]   Model optia   Apheresis Fluid/Transfusion   Procedure Type Plasmapheresis   Procedure # 1   Optia Run Results   AC used 771   Remove bag 3666   Replacement Used 4350   Fluid Balance (%) 124   Inlet processed 7711   Plasma volumes exchanged 0.9   Plasma removed 3012   AC in remove bag 644   AC to patient 127   AC used for prime 19   Post-Apheresis   Finish Time 2312   Rinse Time 2317   Access Functioned Well   Next Treatment Date 03/28/25   Reaction None   Tolerance well   Blood Transfusion? No     Optia max exchange is 1.24 volumes when treatment information entered, pt net positive 1615 mL, tolerated well, 4500ml albumin 5% used

## 2025-03-28 NOTE — PROVIDER PROGRESS NOTES - EMERGENCY DEPT.
"Encounter Date: 3/27/2025    ED Physician Progress Notes          Intubation    Date/Time: 3/27/2025 8:34 PM  Location procedure was performed: [facility] EMERGENCY DEPARTMENT    Performed by: Ronnie Trammell MD  Authorized by: Ronnie Trammell MD  Consent Done: Yes  Consent: Verbal consent obtained  Risks and benefits: risks, benefits and alternatives were discussed  Consent given by: patient  Patient understanding: patient states understanding of the procedure being performed  Patient consent: the patient's understanding of the procedure matches consent given  Procedure consent: procedure consent matches procedure scheduled  Relevant documents: relevant documents present and verified  Test results: test results available and properly labeled  Site marked: the operative site was marked  Imaging studies: imaging studies available  Required items: required blood products, implants, devices, and special equipment available  Patient identity confirmed: verbally with patient, name, , provided demographic data and MRN  Time out: Immediately prior to procedure a "time out" was called to verify the correct patient, procedure, equipment, support staff and site/side marked as required.  Intubation method: lighted stylet  Patient status: sedated  Preoxygenation: BVM and nasal cannula  Sedatives: etomidate and propofol  Paralytic: rocuronium  Laryngoscope size: Glide 3  Tube size: 8.0 mm  Number of attempts: 1  Post-procedure assessment: chest rise, ETCO2 monitor, CO2 detector and esophageal detector  Breath sounds: clear  ETT to lip: 24 cm  Tube secured with: adhesive tape and oral airway  Chest x-ray findings: endotracheal tube in appropriate position  Specimens: No  Implants: No  Comments: Attempted intubation without paralytic, however the patient began to have supraglottic spasms and the patient was given 50 mg of rocuronium. Intubation completed without difficulty, milky white secretions were suctioned.          "

## 2025-03-28 NOTE — PROGRESS NOTES
Inpatient Nutrition Assessment    Admit Date: 3/27/2025   Total duration of encounter: 1 day   Patient Age: 65 y.o.    Nutrition Recommendation/Prescription     Start tube feeding when appropriate.  Tube feeding recommendation:     Peptamen Intense VHP goal rate 50 ml/hr + 3 packets ProSource TF20 daily to provide  1240 kcal/d  (72% est needs, 100% est needs)  153 g protein/d (101% est needs)  840 ml free water/d (34% est need)  (calculations based on estimated 20 hr/d run time)     If no IV fluids running, can give 150ml q 2hr water flushes. Total water provided: 2340ml (96% est needs.)     Start @ 20ml/hr, increase as tolerated 20ml/hr q4hr until goal rate reached.      Communication of Recommendations: reviewed with nurse    Nutrition Assessment     Malnutrition Assessment/Nutrition-Focused Physical Exam       Malnutrition Level: other (see comments) (Does not meet criteria) (03/28/25 1051)     Weight Loss (Malnutrition): other (see comments) (Does not meet criteria) (03/28/25 1051)                                         Fluid Accumulation (Malnutrition): other (see comments) (Not present) (03/28/25 1051)        A minimum of two characteristics is recommended for diagnosis of either severe or non-severe malnutrition.    Chart Review    Reason Seen: continuous nutrition monitoring    Malnutrition Screening Tool Results   Have you recently lost weight without trying?: Yes: 34 lbs or more  Have you been eating poorly because of a decreased appetite?: Yes   MST Score: 5   Diagnosis:  Myasthenia gravis crisis     Relevant Medical History: myasthenia gravis     Scheduled Medications:  chlorhexidine, 15 mL, BID  dexAMETHasone injection, 2 mg, Daily  enoxparin, 40 mg, Daily  famotidine (PF), 20 mg, Q12H  mupirocin, , BID  predniSONE, 40 mg, Daily  pyridostigmine, 1 mg, Q8H    Continuous Infusions:  fentanyl, Last Rate: 50 mcg/hr (03/27/25 2150)  propofoL, Last Rate: 20 mcg/kg/min (03/28/25 0367)    PRN  "Medications:  acetaminophen, 650 mg, Q4H PRN  calcium gluconate IVPB, 1 g, PRN  calcium gluconate IVPB, 2 g, PRN  calcium gluconate IVPB, 3 g, PRN  hydrALAZINE, 10 mg, Q6H PRN  ondansetron, 4 mg, Q8H PRN  potassium chloride, 40 mEq, PRN  sodium chloride 0.9%, 10 mL, PRN  sodium phosphate 15 mmol in D5W 250 mL IVPB, 15 mmol, PRN  sodium phosphate 20.1 mmol in D5W 250 mL IVPB, 20.1 mmol, PRN  sodium phosphate 30 mmol in D5W 250 mL IVPB, 30 mmol, PRN    Calorie Containing IV Medications: Diprivan @ 15 ml/hr (provides 400 kcal/d)    Recent Labs   Lab 03/27/25  1034 03/28/25  0401    139   K 3.9 3.9   CALCIUM 9.8 8.8    109*   CO2 26 18*   BUN 18.2 18.6   CREATININE 0.79 0.76   EGFRNORACEVR >60 >60   GLUCOSE 104 108   BILITOT 3.0* 3.8*   ALKPHOS 80 29*   ALT 23 9   AST 16 9*   ALBUMIN 4.1 5.0*   WBC 17.35* 8.24   HGB 16.2 13.1*   HCT 48.2 39.8*     Nutrition Orders:  Diet NPO      Appetite/Oral Intake: not applicable/not applicable  Factors Affecting Nutritional Intake: on mechanical ventilation  Social Needs Impacting Access to Food: unable to assess at this time; will attempt on follow-up  Food/Buddhism/Cultural Preferences: unable to obtain  Food Allergies: no known food allergies  Last Bowel Movement: 03/26/25  Wound(s):  None documented     Comments    3/28/25: Discussed with RN. Will provide tube feeding recommendations for when appropriate to start tube feeding. Receiving kcal from meds.  Noted MST indicates wt loss, unable to verify at this time. Noted previous EMR wts indicate wt stable. Possible 5# wt loss since August 2024. Plans for several rounds of plasmapheresis.     Anthropometrics    Height: 5' 10" (177.8 cm), Height Method: Stated  Last Weight: 122.5 kg (270 lb) (03/27/25 1012),    BMI (Calculated): 38.7  BMI Classification: obese grade II (BMI 35-39.9)        Ideal Body Weight (IBW), Male: 166 lb     % Ideal Body Weight, Male (lb): 162.65 %                          Usual Weight Provided " By: unable to obtain usual weight    Wt Readings from Last 5 Encounters:   03/27/25 122.5 kg (270 lb)   02/04/25 122.5 kg (270 lb)   01/24/25 122.5 kg (270 lb)   10/08/24 124.7 kg (275 lb)   08/28/24 124.7 kg (275 lb)     Weight Change(s) Since Admission:   Wt Readings from Last 1 Encounters:   03/27/25 1012 122.5 kg (270 lb)   Admit Weight: 122.5 kg (270 lb) (03/27/25 1012),      Estimated Needs    Weight Used For Calorie Calculations: 122.5 kg (270 lb 1 oz)  Energy Calorie Requirements (kcal): 1348-1715kcal (11-14kcal/kg)  Energy Need Method: Kcal/kg  Weight Used For Protein Calculations: 75.4 kg (166 lb 5.4 oz)  Protein Requirements: 151gm (2g/kg IBW)  Fluid Requirements (mL): 2450ml (20ml/kg)  CHO Requirement: 190gm (45% est needs)     Enteral Nutrition     Patient not receiving enteral nutrition at this time.    Parenteral Nutrition     Patient not receiving parenteral nutrition support at this time.    Evaluation of Received Nutrient Intake    Calories: not meeting estimated needs  Protein: not meeting estimated needs    Patient Education     Not applicable.    Nutrition Diagnosis     PES: Inadequate oral intake related to acute illness as evidenced by intubation since admit. (new)     PES:            Nutrition Interventions     Intervention(s): modified composition of enteral nutrition, modified rate of enteral nutrition, and collaboration with other providers  Intervention(s):      Goal: Meet greater than 80% of nutritional needs by follow-up. (new)  Goal: Tolerate enteral feeding at goal rate by follow-up. (new)    Nutrition Goals & Monitoring     Dietitian will monitor: energy intake and enteral nutrition intake  Discharge planning: too early to determine; pending clinical course  Nutrition Risk/Follow-Up: high (follow-up in 1-4 days)   Please consult if re-assessment needed sooner.

## 2025-03-28 NOTE — PROGRESS NOTES
[facility] Inpatient Progress Note      HPI:     Patient Name: Vignesh Lomas  Admission Date: 3/27/2025  Hospital Length of Stay: 1 days  Code Status: Full Code   Attending Physician: No att. providers found   Primary Care Physician: Grey Guerrero FNP  Principal Problem:<principal problem not specified>      Today patient seen and examined  Labs, recent events, imaging and medications reviewed for this hospital stay  Intubated  Alert and responsive  Tolerated plex well    Review of Systems:   Intubated and alert  Stable hemodyamics  Otherwise no change      Medications:   Scheduled Meds:   chlorhexidine  15 mL Mouth/Throat BID    dexAMETHasone injection  2 mg Intravenous Daily    enoxparin  40 mg Subcutaneous Daily    famotidine (PF)  20 mg Intravenous Q12H    mupirocin   Nasal BID    predniSONE  40 mg Oral Daily     Continuous Infusions:   fentanyl  0-250 mcg/hr Intravenous Continuous 5 mL/hr at 03/27/25 2132 50 mcg/hr at 03/27/25 2132    propofoL  0-50 mcg/kg/min Intravenous Continuous 18.4 mL/hr at 03/28/25 0244 25 mcg/kg/min at 03/28/25 0244         Vitals:     Vitals:    03/28/25 0400 03/28/25 0500 03/28/25 0524 03/28/25 0600   BP: 117/64 (!) 94/55  (!) 100/56   Pulse: 63 (!) 52 (!) 52 (!) 51   Resp:       Temp:       TempSrc:       SpO2: 97% 98% 98% 98%   Weight:       Height:             I/O last 3 completed shifts:  In: 4650 [I.V.:4500; IV Piggyback:150]  Out: 3497 [Urine:412; Other:3085]    Intake/Output Summary (Last 24 hours) at 3/28/2025 0826  Last data filed at 3/28/2025 0339  Gross per 24 hour   Intake 4650 ml   Output 3497 ml   Net 1153 ml       Physical Exam:   General: no acute distress, awake, alert  Eyes: PERRLA, EOMI, conjunctiva clear, eyelids without swelling  HENT: intubated  Neck: full ROM, no JVD, no thyromegaly or lymphadenopathy  Respiratory: rhonchi  Cardiovascular: RRR without rub; BL radial and pedal pulses felt  Edema: none  Gastrointestinal: soft, non-tender, non-distended;  positive bowel sounds; no masses to palpation    Musculoskeletal: full ROM without limitation or discomfort  Integumentary: warm, dry; no rashes, wounds, or skin lesions  Neurological: intubated, responsive, moves ext      Labs:     Chemistries:   Recent Labs   Lab 03/27/25  1034 03/28/25  0401    139   K 3.9 3.9    109*   CO2 26 18*   BUN 18.2 18.6   CREATININE 0.79 0.76   CALCIUM 9.8 8.8   BILITOT 3.0* 3.8*   ALKPHOS 80 29*   ALT 23 9   AST 16 9*   GLUCOSE 104 108        CBC/Anemia Labs: Coags:    Recent Labs   Lab 03/27/25  1034 03/28/25  0401   WBC 17.35* 8.24   HGB 16.2 13.1*   HCT 48.2 39.8*    220   MCV 89.1 89.8   RDW 13.0 13.0    Recent Labs   Lab 03/27/25  1034 03/28/25  0401   INR  --  1.5*   APTT 29.0  --           Impression:      Severe MG, SP plasmapheresis x 1 yesterday    Plan:   Plasma exchange x 2 today  1.5 plasma volume, replace with albumin  Labs , coags and fibrinogen in am         Lukas Peters

## 2025-03-28 NOTE — CARE UPDATE
PLAN OF CARE NOTE:    Patient is a 64 yo M with pmh of MG currently on physostigmine and prednisone therapy that presents to the ED for acute weakness, increased secretions, and trouble swallowing. He was admitted to the ICU for monitoring. General surgery was consulted for placement of HD catheter for plasmapheresis.    - Right subclavian HD catheter inserted by Dr. Moffett at bedside without complications  - Post-procedure CXR showed good placement with termination at the superior vena cava  - Please contact with questions or concerns, surgery will sign off at this time    Sung Amaral MD, MPH  Acute Care Surgery

## 2025-03-28 NOTE — PROGRESS NOTES
[facility]  Pulmonary Critical Care Note    Patient Name: Vignesh Lomas  MRN: [id number]  Admission Date: 3/27/2025  Hospital Length of Stay: 1 days  Code Status: Full Code  Attending Provider: No att. providers found  Primary Care Provider: Grey Guerrero FNP     Subjective:     HPI:   Patient is a 65-year-old male with a past medical history significant for myasthenia gravis currently on fingerstick mean and prednisone therapy.  History and ROS somewhat given by wife at bedside due to patient's difficulty with talking.  Patient and wife report that he was diagnosed with myasthenia gravis 2 years ago and has been on visits stick main and prednisone for the past 6 months.  Yesterday, patient was having difficulty swallowing.  He could not swallow his medicines as directed or water/food.  Patient presented to the ED at [facility] this morning with complaints of difficulty swallowing, trouble controlling his secretions, difficulty speaking.  He was not able to take his visiting me last night or this morning.  Was able to take his prednisone this morning.  Patient is having a wet cough, unable to control secretions/sputum.  Plan for patient was to start IVIG tomorrow.  Patient was examined in the ED.  Patient was resting in bed in no acute distress.  Patient had difficulty talking we are controlling secretions.  Patient was able to better control his secretions with wall suction.  Patient was able to ambulate and breathe without difficulty.  Patient denies any increased work of breath were short windedness.  Chest x-ray findings were unremarkable.  Some coarse rhonchi were noted centrally, bilaterally.  Patient was saturating low to mid 90s on 2 L nasal cannula.  CBC, CMP unremarkable.  Neurology, Nephrology have been consulted.  Plan is for plasmapheresis inpatient and placement of plasmapheresis catheter.  Due to decompensating myasthenia gravis requiring plasmapheresis, we will  admit patient to ICU.    Hospital Course/Significant events:  3/27/2025 - plan for plasmapheresis catheter and plasmapheresis    24 Hour Interval History:  NAEON. VSS. Patient remains intubated. Awake and alert and doing well. Has no acute complaints. Had HD cath placed yesterday and tolerated 1/5 PLEX well. CBC with resolved leukocytosis, drop in H&H to 13.1 and 39.8 but no active bleeding. Mildly elevated INR of 1.5. Fibrinogen normal this morning CMP with mild hyperchloremia and non gap acidosis with bicarb of 18.     Past Medical History:   Diagnosis Date    Gout, unspecified        Past Surgical History:   Procedure Laterality Date    FOOT SURGERY Left     02/08/2023 achilles tendon    HERNIA REPAIR      HERNIA REPAIR      SKIN CANCER EXCISION         Social History[1]        Current Outpatient Medications   Medication Instructions    ALPRAZolam (XANAX) 0.5 mg, 2 times daily PRN    azelastine (ASTELIN) 137 mcg (0.1 %) nasal spray 2 sprays, Nightly PRN    predniSONE (DELTASONE) 30 mg, Oral, Daily    pyridostigmine (MESTINON) 180 mg, Oral, 3 times daily    pyridostigmine (MESTINON) 120 mg, Oral, 4 times daily, Takes at 0600, 1000, 1400, and 1800    zolpidem (AMBIEN) 10 mg, Nightly PRN       Review of patient's allergies indicates:  No Known Allergies     Current Inpatient Medications   chlorhexidine  15 mL Mouth/Throat BID    dexAMETHasone injection  2 mg Intravenous Daily    enoxparin  40 mg Subcutaneous Daily    famotidine (PF)  20 mg Intravenous Q12H    mupirocin   Nasal BID    predniSONE  40 mg Oral Daily       Current Intravenous Infusions   fentanyl  0-250 mcg/hr Intravenous Continuous 5 mL/hr at 03/27/25 2132 50 mcg/hr at 03/27/25 2132    propofoL  0-50 mcg/kg/min Intravenous Continuous 18.4 mL/hr at 03/28/25 0244 25 mcg/kg/min at 03/28/25 0244         Review of Systems   Constitutional:  Negative for chills and fever.   HENT:          Increasing difficulty with swallowing, unable to manipulate or extend  tongue.   Respiratory:  Negative for cough and shortness of breath.         Increased sputum due to inability to swallow/control   Cardiovascular:  Negative for chest pain, palpitations and leg swelling.   Gastrointestinal:  Negative for abdominal pain, constipation, diarrhea, nausea and vomiting.   Musculoskeletal:  Negative for back pain and myalgias.   Neurological:  Negative for weakness and headaches.   Psychiatric/Behavioral:  Negative for depression.           Objective:       Intake/Output Summary (Last 24 hours) at 3/28/2025 0800  Last data filed at 3/28/2025 0339  Gross per 24 hour   Intake 4650 ml   Output 3497 ml   Net 1153 ml         Vital Signs (Most Recent):  Temp: 97.8 °F (36.6 °C) (03/27/25 1012)  Pulse: (!) 51 (03/28/25 0600)  Resp: 13 (03/27/25 2030)  BP: (!) 100/56 (03/28/25 0600)  SpO2: 98 % (03/28/25 0600)  Body mass index is 38.74 kg/m².  Weight: 122.5 kg (270 lb) Vital Signs (24h Range):  Temp:  [97.8 °F (36.6 °C)] 97.8 °F (36.6 °C)  Pulse:  [51-95] 51  Resp:  [13-23] 13  SpO2:  [87 %-100 %] 98 %  BP: ()/() 100/56     Physical Exam  Vitals reviewed.   Constitutional:       Appearance: Normal appearance. He is not ill-appearing.      Comments: In good spirits   HENT:      Head: Normocephalic.      Nose: Nose normal.      Mouth/Throat:      Mouth: Mucous membranes are moist.      Comments: ETT in  place  Eyes:      Pupils: Pupils are equal, round, and reactive to light.   Cardiovascular:      Rate and Rhythm: Normal rate and regular rhythm.      Heart sounds: No murmur heard.  Pulmonary:      Effort: Pulmonary effort is normal. No respiratory distress.      Breath sounds: No wheezing or rhonchi.      Comments: Patient was not positive for increased work of breath.  Chest x-ray was negative.  Increase coarse, rhonchi type breath sounds noted centrally, bilaterally.  Abdominal:      General: Abdomen is flat. There is no distension.      Palpations: Abdomen is soft.      Tenderness:  There is no abdominal tenderness.   Musculoskeletal:         General: No swelling. Normal range of motion.      Cervical back: Normal range of motion.   Skin:     Coloration: Skin is not jaundiced.   Neurological:      Mental Status: He is alert.      Comments: Intubated  Cough and gag present  Moves all 4 extremities spontaneously and follows commands  Strength 5/5 BUE and BLE   Psychiatric:         Mood and Affect: Mood normal.         Behavior: Behavior normal.           Lines/Drains/Airways       Central Venous Catheter Line  Duration                  Hemodialysis Catheter 03/27/25 2140 right subclavian <1 day              Drain  Duration                  Urethral Catheter 03/27/25 2148 <1 day              Airway  Duration                  Airway - Non-Surgical 03/27/25 2100 <1 day              Peripheral Intravenous Line  Duration                  Peripheral IV - Single Lumen 03/27/25 1039 20 G Anterior;Left Hand <1 day         Peripheral IV - Single Lumen 03/27/25 1525 20 G Anterior;Right Forearm <1 day                    Significant Labs:    Lab Results   Component Value Date    WBC 8.24 03/28/2025    HGB 13.1 (L) 03/28/2025    HCT 39.8 (L) 03/28/2025    MCV 89.8 03/28/2025     03/28/2025           BMP  Lab Results   Component Value Date     03/28/2025    K 3.9 03/28/2025    CO2 18 (L) 03/28/2025    BUN 18.6 03/28/2025    CREATININE 0.76 03/28/2025    CALCIUM 8.8 03/28/2025    AGAP 12.0 03/28/2025         ABG  Recent Labs   Lab 03/27/25 2125   PH 7.450   PO2 79.0*   PCO2 38.0   HCO3 26.4*   POCBASEDEF 2.40       Mechanical Ventilation Support:  Not needed at this time.  Vent Mode: A/C (03/28/25 0524)  Ventilator Initiated: Yes (03/27/25 2053)  Set Rate: 22 BPM (03/28/25 0524)  Vt Set: 500 mL (03/28/25 0524)  PEEP/CPAP: 5 cmH20 (03/28/25 0524)  Oxygen Concentration (%): 70 (03/28/25 0415)  Peak Airway Pressure: 17 cmH20 (03/28/25 0524)  Total Ve: 11.2 L/m (03/28/25 0524)      Significant  Imaging:  I have reviewed the pertinent imaging within the past 24 hours.          Assessment/Plan:     Assessment  Myasthenia gravis crisis      Plan  Continue ongoing care in the ICU  Appreciate Neurology recommendations  Will clarify with Neurology on initiation of Pyridostigmine (oral not given yest 2/2 unable to swallow and IV cancelled) Currently no order.  If unable to swallow then continue with IV dexamethasone 2 mg, otherwise po prednisone 40 mg daily  Continue 5 PLEX treatments; 1/5 done  Daily SBT  Appreciate Nephrology recommendations      DVT Prophylaxis:  Lovenox 40 mg   GI Prophylaxis:  Famotidine 20 mg b.i.d.     32 minutes of critical care was time spent personally by me on the following activities: development of treatment plan with patient or surrogate and bedside caregivers, discussions with consultants, evaluation of patient's response to treatment, examination of patient, ordering and performing treatments and interventions, ordering and review of laboratory studies, ordering and review of radiographic studies, pulse oximetry, re-evaluation of patient's condition.  This critical care time did not overlap with that of any other provider or involve time for any procedures.     Ezio Barker MD  Pulmonary Critical Care Medicine  [facility] - Emergency Dept  DOS: 03/28/2025          [1]   Social History  Socioeconomic History    Marital status:    Tobacco Use    Smoking status: Never    Smokeless tobacco: Never   Substance and Sexual Activity    Alcohol use: Not Currently     Comment: beer/wine 1-2 times per week    Drug use: Never     Social Drivers of Health     Financial Resource Strain: Low Risk  (3/27/2025)    Overall Financial Resource Strain (CARDIA)     Difficulty of Paying Living Expenses: Not very hard   Food Insecurity: No Food Insecurity (3/27/2025)    Hunger Vital Sign     Worried About Running Out of Food in the Last Year: Never true     Ran Out of Food in  the Last Year: Never true   Transportation Needs: No Transportation Needs (3/27/2025)    PRAPARE - Transportation     Lack of Transportation (Medical): No     Lack of Transportation (Non-Medical): No   Physical Activity: Inactive (3/27/2025)    Exercise Vital Sign     Days of Exercise per Week: 0 days     Minutes of Exercise per Session: 0 min   Stress: Stress Concern Present (3/27/2025)    [employer] [place] [employer] - Occupational Stress Questionnaire     Feeling of Stress : To some extent   Housing Stability: Low Risk  (3/27/2025)    Housing Stability Vital Sign     Unable to Pay for Housing in the Last Year: No     Homeless in the Last Year: No

## 2025-03-28 NOTE — PROGRESS NOTES
"Got intubated overnight but is feeling well and writes all good. Hes even able to suction himself.  Rounds with ICU nursing.    Exam:   BP (!) 100/56   Pulse (!) 51   Temp 97.8 °F (36.6 °C) (Oral)   Resp 13   Ht 5' 10" (1.778 m)   Wt 122.5 kg (270 lb)   SpO2 98%   BMI 38.74 kg/m²     Eye closure strength slightly weak maybe   EOM's appear ok and no ptosis   alert more alert than yesterday eager to write to share with me that hes doing OK     chlorhexidine  15 mL Mouth/Throat BID    dexAMETHasone injection  2 mg Intravenous Daily    enoxparin  40 mg Subcutaneous Daily    famotidine (PF)  20 mg Intravenous Q12H    mupirocin   Nasal BID    predniSONE  40 mg Oral Daily        Problem list:  Myasthenia gravis crisis.  Got first Plex last night  Dw nephrologist     Plan:  Five plasmaph treatments and hopefully hes extubated in the days ahead and hopefully discharged after the five Plex treatments.    Please excuse electronic transcription errors  "

## 2025-03-28 NOTE — PROGRESS NOTES
Pt completed TPE #2 without complications.    1.5 Vol Exchange with Albumin 5% 5,000ml       03/28/25 1630   Vitals   Pulse 63   Resp 19   BP (!) 150/77   SpO2 100 %   Pain/Comfort/Sleep   POSS (Pasero Opioid-Induced Sed Scale) 1 - Awake and alert   Pre-Apheresis   Start Time 1414   Time-Out Yes   2 Patient Identifiers Verified Yes   Consents Verified Yes   Orders on Chart Yes   Apheresis Fluid/Transfusion   Procedure Type Plasmapheresis   Procedure # 2   Plasma-Volume Removed-SPECTRA   Plasma  5453 mL   ACD-A 993 mL   Total (Plasma) 4460 mL   Plasma - Replaced - SPECTRA   Albumin 5000 mL   ACD-A 321 mL   Optia Run Results   AC used 1314   Remove bag 6461   Replacement Used 5000   Fluid balance (mL) 21   Fluid Balance (%) 100   Inlet processed 79606   Plasma volumes exchanged 1.4   Plasma removed 5453   AC in remove bag 993   AC to patient 321   AC used for prime 19   Saline Rinse 0   Post-Apheresis   Finish Time 1615   Access Functioned Well   Reaction None   Tolerance well   Blood Transfusion? No

## 2025-03-28 NOTE — PT/OT/SLP PROGRESS
[facility] Speech Language Pathology Department    Patient Name:  Vignesh Lomas   MRN:  [id number]    SLP orders received, chart reviewed, and nursing consulted.  Pt with decline in respiratory status requiring intubation for mechanical ventilation.  Skilled SLP services not warranted at this time.  Please reconsult as needed.

## 2025-03-29 LAB
ALBUMIN SERPL-MCNC: 4.7 G/DL (ref 3.4–4.8)
ALBUMIN/GLOB SERPL: 7.8 RATIO (ref 1.1–2)
ALP SERPL-CCNC: 12 UNIT/L (ref 40–150)
ALT SERPL-CCNC: <5 UNIT/L (ref 0–55)
ANION GAP SERPL CALC-SCNC: 9 MEQ/L
AST SERPL-CCNC: 5 UNIT/L (ref 11–45)
BASOPHILS # BLD AUTO: 0.01 X10(3)/MCL
BASOPHILS NFR BLD AUTO: 0.1 %
BILIRUB SERPL-MCNC: 2.7 MG/DL
BUN SERPL-MCNC: 22.6 MG/DL (ref 8.4–25.7)
CALCIUM SERPL-MCNC: 8.7 MG/DL (ref 8.8–10)
CHLORIDE SERPL-SCNC: 114 MMOL/L (ref 98–107)
CO2 SERPL-SCNC: 16 MMOL/L (ref 23–31)
CREAT SERPL-MCNC: 0.65 MG/DL (ref 0.72–1.25)
CREAT/UREA NIT SERPL: 35
EOSINOPHIL # BLD AUTO: 0 X10(3)/MCL (ref 0–0.9)
EOSINOPHIL NFR BLD AUTO: 0 %
ERYTHROCYTE [DISTWIDTH] IN BLOOD BY AUTOMATED COUNT: 13 % (ref 11.5–17)
FIBRINOGEN PPP-MCNC: 105 MG/DL (ref 210–463)
GFR SERPLBLD CREATININE-BSD FMLA CKD-EPI: >60 ML/MIN/1.73/M2
GLOBULIN SER-MCNC: 0.6 GM/DL (ref 2.4–3.5)
GLUCOSE SERPL-MCNC: 146 MG/DL (ref 82–115)
HCT VFR BLD AUTO: 43.4 % (ref 42–52)
HGB BLD-MCNC: 14.5 G/DL (ref 14–18)
IMM GRANULOCYTES # BLD AUTO: 0.08 X10(3)/MCL (ref 0–0.04)
IMM GRANULOCYTES NFR BLD AUTO: 0.6 %
INR PPP: 1.7
LYMPHOCYTES # BLD AUTO: 1.05 X10(3)/MCL (ref 0.6–4.6)
LYMPHOCYTES NFR BLD AUTO: 7.6 %
MCH RBC QN AUTO: 29.8 PG (ref 27–31)
MCHC RBC AUTO-ENTMCNC: 33.4 G/DL (ref 33–36)
MCV RBC AUTO: 89.1 FL (ref 80–94)
MONOCYTES # BLD AUTO: 0.89 X10(3)/MCL (ref 0.1–1.3)
MONOCYTES NFR BLD AUTO: 6.4 %
NEUTROPHILS # BLD AUTO: 11.78 X10(3)/MCL (ref 2.1–9.2)
NEUTROPHILS NFR BLD AUTO: 85.3 %
NRBC BLD AUTO-RTO: 0 %
PLATELET # BLD AUTO: 215 X10(3)/MCL (ref 130–400)
PMV BLD AUTO: 9.3 FL (ref 7.4–10.4)
POTASSIUM SERPL-SCNC: 4.2 MMOL/L (ref 3.5–5.1)
PROT SERPL-MCNC: 5.3 GM/DL (ref 5.8–7.6)
PROTHROMBIN TIME: 19.9 SECONDS (ref 12.5–14.5)
RBC # BLD AUTO: 4.87 X10(6)/MCL (ref 4.7–6.1)
SODIUM SERPL-SCNC: 139 MMOL/L (ref 136–145)
WBC # BLD AUTO: 13.81 X10(3)/MCL (ref 4.5–11.5)

## 2025-03-29 PROCEDURE — 27200966 HC CLOSED SUCTION SYSTEM

## 2025-03-29 PROCEDURE — 85025 COMPLETE CBC W/AUTO DIFF WBC: CPT

## 2025-03-29 PROCEDURE — 94761 N-INVAS EAR/PLS OXIMETRY MLT: CPT

## 2025-03-29 PROCEDURE — 94760 N-INVAS EAR/PLS OXIMETRY 1: CPT

## 2025-03-29 PROCEDURE — 99900026 HC AIRWAY MAINTENANCE (STAT)

## 2025-03-29 PROCEDURE — 94799 UNLISTED PULMONARY SVC/PX: CPT

## 2025-03-29 PROCEDURE — 63600175 PHARM REV CODE 636 W HCPCS: Performed by: INTERNAL MEDICINE

## 2025-03-29 PROCEDURE — 85610 PROTHROMBIN TIME: CPT | Performed by: INTERNAL MEDICINE

## 2025-03-29 PROCEDURE — 27100171 HC OXYGEN HIGH FLOW UP TO 24 HOURS

## 2025-03-29 PROCEDURE — 99231 SBSQ HOSP IP/OBS SF/LOW 25: CPT | Mod: ,,, | Performed by: INTERNAL MEDICINE

## 2025-03-29 PROCEDURE — 21400001 HC TELEMETRY ROOM

## 2025-03-29 PROCEDURE — 36415 COLL VENOUS BLD VENIPUNCTURE: CPT

## 2025-03-29 PROCEDURE — 94003 VENT MGMT INPAT SUBQ DAY: CPT

## 2025-03-29 PROCEDURE — 63600175 PHARM REV CODE 636 W HCPCS

## 2025-03-29 PROCEDURE — 99900031 HC PATIENT EDUCATION (STAT)

## 2025-03-29 PROCEDURE — 25000003 PHARM REV CODE 250: Performed by: INTERNAL MEDICINE

## 2025-03-29 PROCEDURE — 25000003 PHARM REV CODE 250

## 2025-03-29 PROCEDURE — 80053 COMPREHEN METABOLIC PANEL: CPT

## 2025-03-29 PROCEDURE — 99900035 HC TECH TIME PER 15 MIN (STAT)

## 2025-03-29 PROCEDURE — 85384 FIBRINOGEN ACTIVITY: CPT | Performed by: INTERNAL MEDICINE

## 2025-03-29 PROCEDURE — 11000001 HC ACUTE MED/SURG PRIVATE ROOM

## 2025-03-29 RX ORDER — TALC
6 POWDER (GRAM) TOPICAL NIGHTLY PRN
Status: DISCONTINUED | OUTPATIENT
Start: 2025-03-30 | End: 2025-03-30

## 2025-03-29 RX ADMIN — MUPIROCIN: 20 OINTMENT TOPICAL at 08:03

## 2025-03-29 RX ADMIN — CHLORHEXIDINE GLUCONATE 0.12% ORAL RINSE 15 ML: 1.2 LIQUID ORAL at 08:03

## 2025-03-29 RX ADMIN — FAMOTIDINE 20 MG: 10 INJECTION, SOLUTION INTRAVENOUS at 08:03

## 2025-03-29 RX ADMIN — DEXMEDETOMIDINE HYDROCHLORIDE 0.2 MCG/KG/HR: 400 INJECTION INTRAVENOUS at 03:03

## 2025-03-29 RX ADMIN — DEXAMETHASONE SODIUM PHOSPHATE 2 MG: 4 INJECTION, SOLUTION INTRA-ARTICULAR; INTRALESIONAL; INTRAMUSCULAR; INTRAVENOUS; SOFT TISSUE at 08:03

## 2025-03-29 RX ADMIN — ENOXAPARIN SODIUM 40 MG: 40 INJECTION SUBCUTANEOUS at 04:03

## 2025-03-29 NOTE — PLAN OF CARE
Problem: Adult Inpatient Plan of Care  Goal: Plan of Care Review  Outcome: Progressing  Goal: Patient-Specific Goal (Individualized)  Outcome: Progressing  Goal: Absence of Hospital-Acquired Illness or Injury  Outcome: Progressing  Goal: Optimal Comfort and Wellbeing  Outcome: Progressing  Goal: Readiness for Transition of Care  Outcome: Progressing     Problem: Infection  Goal: Absence of Infection Signs and Symptoms  Outcome: Progressing     Problem: Mechanical Ventilation Invasive  Goal: Effective Communication  Outcome: Progressing  Goal: Optimal Device Function  Outcome: Progressing  Goal: Mechanical Ventilation Liberation  Outcome: Progressing  Goal: Optimal Nutrition Delivery  Outcome: Progressing  Goal: Absence of Device-Related Skin and Tissue Injury  Outcome: Progressing  Goal: Absence of Ventilator-Induced Lung Injury  Outcome: Progressing     Problem: Artificial Airway  Goal: Effective Communication  Outcome: Progressing  Goal: Optimal Device Function  Outcome: Progressing  Goal: Absence of Device-Related Skin or Tissue Injury  Outcome: Progressing     Problem: Noninvasive Ventilation Acute  Goal: Effective Unassisted Ventilation and Oxygenation  Outcome: Progressing     Problem: Skin Injury Risk Increased  Goal: Skin Health and Integrity  Outcome: Progressing

## 2025-03-29 NOTE — PROGRESS NOTES
[facility] - [address] [place] [address]  Pulmonary/Critical Care  Progress Note  3/29/2025    Patient Name: Vignesh Lomas  MRN: [id number]  Admission Date: 3/27/2025  Code Status: Full Code      Subjective:     HPI:  Patient is a 65-year-old male with a past medical history significant for myasthenia gravis currently on fingerstick mean and prednisone therapy. History and ROS somewhat given by wife at bedside due to patient's difficulty with talking. Patient and wife report that he was diagnosed with myasthenia gravis 2 years ago and has been on visits stick main and prednisone for the past 6 months. Yesterday, patient was having difficulty swallowing. He could not swallow his medicines as directed or water/food. Patient presented to the ED at [facility] this morning with complaints of difficulty swallowing, trouble controlling his secretions, difficulty speaking. He was not able to take his visiting me last night or this morning. Was able to take his prednisone this morning. Patient is having a wet cough, unable to control secretions/sputum. Plan for patient was to start IVIG tomorrow. Patient was examined in the ED. Patient was resting in bed in no acute distress. Patient had difficulty talking we are controlling secretions. Patient was able to better control his secretions with wall suction. Patient was able to ambulate and breathe without difficulty. Patient denies any increased work of breath were short windedness. Chest x-ray findings were unremarkable. Some coarse rhonchi were noted centrally, bilaterally. Patient was saturating low to mid 90s on 2 L nasal cannula. CBC, CMP unremarkable. Neurology, Nephrology have been consulted. Plan is for plasmapheresis inpatient and placement of plasmapheresis catheter. Due to decompensating myasthenia gravis requiring plasmapheresis, we will admit patient to ICU.     Hospital Course:      24hr Interval History:  He is afebrile.  Secretions are minimal, good cough  effort, no desaturations on mechanical ventilatory support.    Scheduled Medications:   chlorhexidine  15 mL Mouth/Throat BID    dexAMETHasone injection  2 mg Intravenous Daily    enoxparin  40 mg Subcutaneous Daily    famotidine (PF)  20 mg Intravenous Q12H    mupirocin   Nasal BID     PRN Medications:    Current Facility-Administered Medications:     acetaminophen, 650 mg, Oral, Q4H PRN    calcium gluconate IVPB, 1 g, Intravenous, PRN    calcium gluconate IVPB, 2 g, Intravenous, PRN    calcium gluconate IVPB, 3 g, Intravenous, PRN    hydrALAZINE, 10 mg, Intravenous, Q6H PRN    ondansetron, 4 mg, Intravenous, Q8H PRN    potassium chloride, 40 mEq, Intravenous, PRN    sodium chloride 0.9%, 10 mL, Intravenous, PRN    sodium phosphate 15 mmol in D5W 250 mL IVPB, 15 mmol, Intravenous, PRN    sodium phosphate 20.1 mmol in D5W 250 mL IVPB, 20.1 mmol, Intravenous, PRN    sodium phosphate 30 mmol in D5W 250 mL IVPB, 30 mmol, Intravenous, PRN  Continuous Infusions:   dexmedeTOMIDine (Precedex) infusion (titrating)  0-1.4 mcg/kg/hr Intravenous Continuous 6.13 mL/hr at 03/29/25 0300 0.2 mcg/kg/hr at 03/29/25 0300    fentanyl  0-250 mcg/hr Intravenous Continuous 2.5 mL/hr at 03/28/25 1708 25 mcg/hr at 03/28/25 1708       Past Medical History:   Diagnosis Date    Gout, unspecified        Past Surgical History:   Procedure Laterality Date    FOOT SURGERY Left     02/08/2023 achilles tendon    HERNIA REPAIR      HERNIA REPAIR      SKIN CANCER EXCISION         Objective:     Input/output:    Intake/Output Summary (Last 24 hours) at 3/29/2025 1112  Last data filed at 3/29/2025 1000  Gross per 24 hour   Intake 1025.79 ml   Output 9995 ml   Net -8969.21 ml       Vital Signs (Most Recent):  Temp: 97.8 °F (36.6 °C) (03/29/25 0800)  Pulse: (!) 41 (03/29/25 0800)  Resp: 20 (03/29/25 0800)  BP: (!) 89/58 (03/29/25 0800)  SpO2: 97 % (03/29/25 0800)  Body mass index is 38.74 kg/m².  Weight: 122.5 kg (270 lb) Vital Signs (24h Range):  Temp:   [97.7 °F (36.5 °C)-97.9 °F (36.6 °C)] 97.8 °F (36.6 °C)  Pulse:  [38-75] 41  Resp:  [12-23] 20  SpO2:  [96 %-100 %] 97 %  BP: ()/(44-83) 89/58     Physical Exam  Constitutional:       Comments: Comfortable respiratory status on mechanical ventilation   HENT:      Mouth/Throat:      Comments: Endotracheal tube secured to external os  Eyes:      Pupils: Pupils are equal, round, and reactive to light.   Cardiovascular:      Rate and Rhythm: Normal rate and regular rhythm.      Heart sounds: No murmur heard.  Pulmonary:      Breath sounds: No wheezing, rhonchi or rales.   Abdominal:      General: Bowel sounds are normal.      Palpations: Abdomen is soft.   Musculoskeletal:      Right lower leg: No edema.      Left lower leg: No edema.   Skin:     General: Skin is warm and dry.   Neurological:      Mental Status: He is alert and oriented to person, place, and time.      Comments: Opposes gravity with upper and lower extremities equal bilateral         Lines/Drains/Airways       Central Venous Catheter Line  Duration                  Hemodialysis Catheter 03/27/25 2140 right subclavian 1 day              Drain  Duration                  Urethral Catheter 03/27/25 2148 1 day              Airway  Duration                  Airway - Non-Surgical 03/27/25 2100 1 day              Peripheral Intravenous Line  Duration                  Peripheral IV - Single Lumen 03/27/25 1039 20 G Anterior;Left Hand 2 days                    Vent:  Vent Mode: SIMV (03/29/25 0500)  Ventilator Initiated: Yes (03/27/25 2053)  Set Rate: 20 BPM (03/29/25 0500)  Vt Set: 500 mL (03/29/25 0500)  Pressure Support: 10 cmH20 (03/29/25 0500)  PEEP/CPAP: 5 cmH20 (03/29/25 0500)  Oxygen Concentration (%): 40 (03/28/25 1737)  Peak Airway Pressure: 19 cmH20 (03/29/25 0500)  Total Ve: 9.5 L/m (03/29/25 0500)  F/VT Ratio<105 (RSBI): (!) 42.64 (03/29/25 0500)    ABGs:  Lab Results   Component Value Date    PH 7.450 03/27/2025    PO2 79.0 (L) 03/27/2025     PCO2 38.0 03/27/2025         Significant Labs:    Lab Results   Component Value Date    WBC 13.81 (H) 03/29/2025    HGB 14.5 03/29/2025    HCT 43.4 03/29/2025    MCV 89.1 03/29/2025     03/29/2025         Recent Labs   Lab 03/29/25  0233 03/29/25  0234     --    K 4.2  --    *  --    CO2 16*  --    BUN 22.6  --    CREATININE 0.65*  --    CALCIUM 8.7*  --    AST 5*  --    ALT <5  --    ALKPHOS 12*  --    ALBUMIN 4.7  --    INR  --  1.7*     Imaging:   Chest x-ray (03/29/2025, my reading of images):  Endotracheal tube adequate placement.  Bilateral linear atelectasis lower lobes.    Assessment:     Myasthenia gravis on chronic pyridostigmine and daily prednisone.  Admitted 03/27/2025 with myasthenia crisis, manifested by dysphagia, poor cough effort and difficulty handling respiratory secretions, and respiratory muscle weakness.  Daily PLEX treatments began 03/27/2025 (on hold this a.m. due to low fibrinogen)  Acute respiratory failure secondary to above, intubated 03/27/2025.  He is oxygenating well with good spontaneous volumes and cough effort.  Morbid obesity (BMI 38.7)    Plan:     Spontaneous breathing trial as tolerated this a.m., hopefully to extubate.  Continue current IV dexamethasone.  As above, PLEX on hold today due to low fibrinogen.           35 minutes of critical care was time spent personally by me on the following activities: development of treatment plan with patient or surrogate and bedside caregivers, discussions with consultants, evaluation of patient's response to treatment, examination of patient, ordering and performing treatments and interventions, ordering and review of laboratory studies, ordering and review of radiographic studies, pulse oximetry, re-evaluation of patient's condition.  This patient demonstrates a high probability for further clinical decompensation due to ongoing critical illness.  Critical care time did not overlap with that of any other provider or  involve time for any procedures.       Renee Dougherty MD, [facility]  Pulmonary/Critical Care

## 2025-03-29 NOTE — PROGRESS NOTES
Renal  Pt seen and examined  Meds and labs and events reviewed  Afeb  Hemod stable  Remains intubated  Lungs rhochi  Rrr  Abd soft  No edema    Moving ext well      Impressions  Myasthenia Gravis needing pheresis    P  In view of low fibrinogen today,. Would hold off pheresis for today  Labs in am

## 2025-03-30 LAB
ALBUMIN SERPL-MCNC: 5.1 G/DL (ref 3.4–4.8)
ALBUMIN/GLOB SERPL: 3.6 RATIO (ref 1.1–2)
ALP SERPL-CCNC: 25 UNIT/L (ref 40–150)
ALT SERPL-CCNC: 16 UNIT/L (ref 0–55)
ANION GAP SERPL CALC-SCNC: 10 MEQ/L
AST SERPL-CCNC: 17 UNIT/L (ref 11–45)
B PERT.PT PRMT NPH QL NAA+NON-PROBE: NOT DETECTED
BASOPHILS # BLD AUTO: 0.02 X10(3)/MCL
BASOPHILS NFR BLD AUTO: 0.1 %
BILIRUB SERPL-MCNC: 2.3 MG/DL
BUN SERPL-MCNC: 19 MG/DL (ref 8.4–25.7)
C PNEUM DNA NPH QL NAA+NON-PROBE: NOT DETECTED
CALCIUM SERPL-MCNC: 8.9 MG/DL (ref 8.8–10)
CHLORIDE SERPL-SCNC: 110 MMOL/L (ref 98–107)
CO2 SERPL-SCNC: 21 MMOL/L (ref 23–31)
CREAT SERPL-MCNC: 0.63 MG/DL (ref 0.72–1.25)
CREAT/UREA NIT SERPL: 30
EOSINOPHIL # BLD AUTO: 0 X10(3)/MCL (ref 0–0.9)
EOSINOPHIL NFR BLD AUTO: 0 %
ERYTHROCYTE [DISTWIDTH] IN BLOOD BY AUTOMATED COUNT: 13.4 % (ref 11.5–17)
FIBRINOGEN PPP-MCNC: 178 MG/DL (ref 210–463)
FLUAV AG UPPER RESP QL IA.RAPID: NOT DETECTED
FLUBV AG UPPER RESP QL IA.RAPID: NOT DETECTED
GFR SERPLBLD CREATININE-BSD FMLA CKD-EPI: >60 ML/MIN/1.73/M2
GLOBULIN SER-MCNC: 1.4 GM/DL (ref 2.4–3.5)
GLUCOSE SERPL-MCNC: 105 MG/DL (ref 82–115)
HADV DNA NPH QL NAA+NON-PROBE: NOT DETECTED
HCOV 229E RNA NPH QL NAA+NON-PROBE: NOT DETECTED
HCOV HKU1 RNA NPH QL NAA+NON-PROBE: NOT DETECTED
HCOV NL63 RNA NPH QL NAA+NON-PROBE: NOT DETECTED
HCOV OC43 RNA NPH QL NAA+NON-PROBE: NOT DETECTED
HCT VFR BLD AUTO: 45.4 % (ref 42–52)
HGB BLD-MCNC: 15.7 G/DL (ref 14–18)
HMPV RNA NPH QL NAA+NON-PROBE: NOT DETECTED
HPIV1 RNA NPH QL NAA+NON-PROBE: NOT DETECTED
HPIV2 RNA NPH QL NAA+NON-PROBE: NOT DETECTED
HPIV3 RNA NPH QL NAA+NON-PROBE: NOT DETECTED
HPIV4 RNA NPH QL NAA+NON-PROBE: NOT DETECTED
IMM GRANULOCYTES # BLD AUTO: 0.11 X10(3)/MCL (ref 0–0.04)
IMM GRANULOCYTES NFR BLD AUTO: 0.5 %
INR PPP: 1.2
LYMPHOCYTES # BLD AUTO: 1.84 X10(3)/MCL (ref 0.6–4.6)
LYMPHOCYTES NFR BLD AUTO: 9.1 %
M PNEUMO DNA NPH QL NAA+NON-PROBE: NOT DETECTED
MCH RBC QN AUTO: 30.2 PG (ref 27–31)
MCHC RBC AUTO-ENTMCNC: 34.6 G/DL (ref 33–36)
MCV RBC AUTO: 87.3 FL (ref 80–94)
MONOCYTES # BLD AUTO: 1.88 X10(3)/MCL (ref 0.1–1.3)
MONOCYTES NFR BLD AUTO: 9.3 %
NEUTROPHILS # BLD AUTO: 16.44 X10(3)/MCL (ref 2.1–9.2)
NEUTROPHILS NFR BLD AUTO: 81 %
NRBC BLD AUTO-RTO: 0 %
PHOSPHATE SERPL-MCNC: 1.9 MG/DL (ref 2.3–4.7)
PLATELET # BLD AUTO: 277 X10(3)/MCL (ref 130–400)
PMV BLD AUTO: 8.9 FL (ref 7.4–10.4)
POTASSIUM SERPL-SCNC: 3.9 MMOL/L (ref 3.5–5.1)
PROT SERPL-MCNC: 6.5 GM/DL (ref 5.8–7.6)
PROTHROMBIN TIME: 14.8 SECONDS (ref 12.5–14.5)
RBC # BLD AUTO: 5.2 X10(6)/MCL (ref 4.7–6.1)
RSV A 5' UTR RNA NPH QL NAA+PROBE: NOT DETECTED
RSV RNA NPH QL NAA+NON-PROBE: NOT DETECTED
RV+EV RNA NPH QL NAA+NON-PROBE: DETECTED
SARS-COV-2 RNA RESP QL NAA+PROBE: NOT DETECTED
SODIUM SERPL-SCNC: 141 MMOL/L (ref 136–145)
WBC # BLD AUTO: 20.29 X10(3)/MCL (ref 4.5–11.5)

## 2025-03-30 PROCEDURE — 25000003 PHARM REV CODE 250: Performed by: INTERNAL MEDICINE

## 2025-03-30 PROCEDURE — 80053 COMPREHEN METABOLIC PANEL: CPT

## 2025-03-30 PROCEDURE — P9045 ALBUMIN (HUMAN), 5%, 250 ML: HCPCS | Mod: JZ | Performed by: INTERNAL MEDICINE

## 2025-03-30 PROCEDURE — 63600175 PHARM REV CODE 636 W HCPCS: Performed by: INTERNAL MEDICINE

## 2025-03-30 PROCEDURE — 84100 ASSAY OF PHOSPHORUS: CPT | Performed by: INTERNAL MEDICINE

## 2025-03-30 PROCEDURE — 0241U COVID/RSV/FLU A&B PCR: CPT | Performed by: INTERNAL MEDICINE

## 2025-03-30 PROCEDURE — 63600175 PHARM REV CODE 636 W HCPCS

## 2025-03-30 PROCEDURE — 25000003 PHARM REV CODE 250

## 2025-03-30 PROCEDURE — 85610 PROTHROMBIN TIME: CPT | Performed by: INTERNAL MEDICINE

## 2025-03-30 PROCEDURE — 63600175 PHARM REV CODE 636 W HCPCS: Mod: JZ | Performed by: INTERNAL MEDICINE

## 2025-03-30 PROCEDURE — 87798 DETECT AGENT NOS DNA AMP: CPT | Performed by: INTERNAL MEDICINE

## 2025-03-30 PROCEDURE — 99232 SBSQ HOSP IP/OBS MODERATE 35: CPT | Mod: ,,, | Performed by: INTERNAL MEDICINE

## 2025-03-30 PROCEDURE — 21400001 HC TELEMETRY ROOM

## 2025-03-30 PROCEDURE — 85025 COMPLETE CBC W/AUTO DIFF WBC: CPT

## 2025-03-30 PROCEDURE — 36415 COLL VENOUS BLD VENIPUNCTURE: CPT

## 2025-03-30 PROCEDURE — 36514 APHERESIS PLASMA: CPT

## 2025-03-30 PROCEDURE — 85384 FIBRINOGEN ACTIVITY: CPT | Performed by: INTERNAL MEDICINE

## 2025-03-30 RX ORDER — TALC
9 POWDER (GRAM) TOPICAL NIGHTLY
Status: DISCONTINUED | OUTPATIENT
Start: 2025-03-30 | End: 2025-04-02 | Stop reason: HOSPADM

## 2025-03-30 RX ORDER — CALCIUM GLUCONATE 20 MG/ML
3 INJECTION, SOLUTION INTRAVENOUS
Status: COMPLETED | OUTPATIENT
Start: 2025-03-30 | End: 2025-03-30

## 2025-03-30 RX ORDER — HYDRALAZINE HYDROCHLORIDE 20 MG/ML
10 INJECTION INTRAMUSCULAR; INTRAVENOUS EVERY 4 HOURS PRN
Status: DISCONTINUED | OUTPATIENT
Start: 2025-03-30 | End: 2025-04-02 | Stop reason: HOSPADM

## 2025-03-30 RX ORDER — ALBUMIN HUMAN 50 G/1000ML
225 SOLUTION INTRAVENOUS
Status: COMPLETED | OUTPATIENT
Start: 2025-03-30 | End: 2025-03-30

## 2025-03-30 RX ORDER — HYDROCODONE BITARTRATE AND ACETAMINOPHEN 500; 5 MG/1; MG/1
TABLET ORAL
Status: DISCONTINUED | OUTPATIENT
Start: 2025-03-30 | End: 2025-04-02 | Stop reason: HOSPADM

## 2025-03-30 RX ADMIN — DEXAMETHASONE SODIUM PHOSPHATE 2 MG: 4 INJECTION, SOLUTION INTRA-ARTICULAR; INTRALESIONAL; INTRAMUSCULAR; INTRAVENOUS; SOFT TISSUE at 09:03

## 2025-03-30 RX ADMIN — FAMOTIDINE 20 MG: 10 INJECTION, SOLUTION INTRAVENOUS at 08:03

## 2025-03-30 RX ADMIN — CHLORHEXIDINE GLUCONATE 0.12% ORAL RINSE 15 ML: 1.2 LIQUID ORAL at 09:03

## 2025-03-30 RX ADMIN — CALCIUM GLUCONATE 3 G: 20 INJECTION, SOLUTION INTRAVENOUS at 12:03

## 2025-03-30 RX ADMIN — MUPIROCIN: 20 OINTMENT TOPICAL at 08:03

## 2025-03-30 RX ADMIN — CHLORHEXIDINE GLUCONATE 0.12% ORAL RINSE 15 ML: 1.2 LIQUID ORAL at 08:03

## 2025-03-30 RX ADMIN — ALBUMIN (HUMAN) 225 G: 12.5 SOLUTION INTRAVENOUS at 12:03

## 2025-03-30 RX ADMIN — ENOXAPARIN SODIUM 40 MG: 40 INJECTION SUBCUTANEOUS at 05:03

## 2025-03-30 RX ADMIN — FAMOTIDINE 20 MG: 10 INJECTION, SOLUTION INTRAVENOUS at 09:03

## 2025-03-30 RX ADMIN — Medication 9 MG: at 08:03

## 2025-03-30 NOTE — H&P
[facility] Hospital Medicine History & Physical Examination       Patient Name: Vignesh Lomas  MRN: [id number]  Patient Class: IP- Inpatient   Admission Date: 3/27/2025 10:14 AM  Length of Stay: 2  Admitting Service: Hospital Medicine   Attending Physician: Kenyon Larios MD   Primary Care Provider: Grey Guerrero FNP  History source: EMR, patient and/or patient's family    CHIEF COMPLAINT   Dysphagia and respiratory secretions    HISTORY OF PRESENT ILLNESS:   Patient is a 65-year-old male with a history of myasthenia gravis on pyridostigmine/prednisone who presented to ER on 03/27/2025 with worsening respiratory status and difficulty swallowing suggesting a acute flare.  Patient was started on Plex treatments on 03/27/2025 and did require intubation 03/27/2025 but has since been extubated today 03/29/2025.  Due to low fibrinogen level plasmapheresis was held today.  Hospitalist consulted for downgrade as patient is now on room air and extubated.    PAST MEDICAL HISTORY:   Myasthenia gravis    PAST SURGICAL HISTORY:     Past Surgical History:   Procedure Laterality Date    FOOT SURGERY Left     02/08/2023 achilles tendon    HERNIA REPAIR      HERNIA REPAIR      SKIN CANCER EXCISION         ALLERGIES:   Patient has no known allergies.    FAMILY HISTORY:   Reviewed and non-contributory     SOCIAL HISTORY:   Screening for Social Drivers for health: Patient screened for food insecurity, housing instability, transportation needs, utility   difficulties, and interpersonal safety (select all that apply as identified as concern)  []Housing or Food  []Transportation Needs  []Utility Difficulties  []Interpersonal safety  [x]None  Social History     Tobacco Use    Smoking status: Never    Smokeless tobacco: Never   Substance Use Topics    Alcohol use: Not Currently     Comment: beer/wine 1-2 times per week        HOME MEDICATIONS:     Prior to Admission medications    Medication Sig Start  "Date End Date Taking? Authorizing Provider   azelastine (ASTELIN) 137 mcg (0.1 %) nasal spray 2 sprays by Nasal route nightly as needed. 9/13/24  Yes Provider, Historical   predniSONE (DELTASONE) 10 MG tablet Take 3 tablets (30 mg total) by mouth once daily. 2/4/25  Yes Hector Patino AGACNP-BC   pyridostigmine (MESTINON) 180 mg TbSR Take 1 tablet (180 mg total) by mouth 3 (three) times daily.  Patient taking differently: Take 180 mg by mouth every evening. Only takes 180 mg per night at 2200 10/28/24 10/28/25 Yes Jamie Alonso MD   pyridostigmine (MESTINON) 60 mg Tab Take 120 mg by mouth 4 (four) times daily. Takes at 0600, 1000, 1400, and 1800   Yes Provider, Historical   ALPRAZolam (XANAX) 0.5 MG tablet Take 0.5 mg by mouth 2 (two) times daily as needed. 1/17/25   Provider, Historical   zolpidem (AMBIEN) 10 mg Tab Take 10 mg by mouth nightly as needed. 1/17/25   Provider, Historical       REVIEW OF SYSTEMS:   Except as documented, all other systems reviewed and negative     PHYSICAL EXAM:   T 97.9 °F (36.6 °C)   BP (!) 143/61   P 82   RR (!) 21   O2 99 %  GENERAL: awake, alert, oriented and in no acute distress, non-toxic appearing   HEENT: normocephalic atraumatic   NECK: supple   LUNGS:  Bilateral rhonchi and coarse breath sounds, no accessory muscle use   CVS: Regular rate and rhythm, normal peripheral perfusion  ABD: Soft, non-tender, non-distended, bowel sounds present  EXTREMITIES: no clubbing or cyanosis  SKIN: Warm, dry.   NEURO: alert and oriented, grossly without focal deficits   PSYCHIATRIC: Cooperative    LABS AND IMAGING:     Recent Labs     03/28/25  0401 03/29/25  0233   WBC 8.24 13.81*   RBC 4.43* 4.87   HGB 13.1* 14.5   HCT 39.8* 43.4   MCV 89.8 89.1   MCH 29.6 29.8   MCHC 32.9* 33.4   RDW 13.0 13.0    215     No results for input(s): "LACTIC" in the last 72 hours.  Recent Labs     03/27/25  1034 03/28/25  0401 03/29/25  0234   INR  --    < > 1.7*   APTT 29.0  --   --     < > = " "values in this interval not displayed.     No results for input(s): "HGBA1C", "CHOL", "TRIG", "LDL", "VLDL", "HDL" in the last 72 hours.   Recent Labs     03/28/25  0401 03/29/25  0233    139   K 3.9 4.2   CO2 18* 16*   BUN 18.6 22.6   CREATININE 0.76 0.65*   GLUCOSE 108 146*   CALCIUM 8.8 8.7*   ALBUMIN 5.0* 4.7   GLOBULIN 1.3* 0.6*   ALKPHOS 29* 12*   ALT 9 <5   AST 9* 5*   BILITOT 3.8* 2.7*     No results for input(s): "BNP", "CPK", "TROPONINI" in the last 72 hours.       X-Ray Chest 1 View  Narrative: EXAMINATION:  XR CHEST 1 VIEW    CLINICAL HISTORY:  respiratory failure;    TECHNIQUE:  One view    COMPARISON:  March 27, 2025..    FINDINGS:  Cardiopericardial silhouette is within normal limits.  Much improved right lower lung lobe opacities.  New left lower lung lobe atelectatic changes.  No pulmonary edema.  No pneumothorax.  No significant fluid within the pleural spaces.  Optimal intubation.  Impression: Improved right lower lung zone atelectasis and new left basilar atelectatic changes.    Electronically signed by: Bjorn Perera  Date:    03/29/2025  Time:    08:23      ASSESSMENT & PLAN:   Myasthenia gravis flare   Acute hypoxemic respiratory failure 2/2 above (extubated 03/29/2025)    -continue plasma exchange as per Nephrology   -continue Decadron  -plan for 5 plasmapheresis treatments per Neurology  -obtain respiratory PCR, flu/COVID/RSV    DVT prophylaxis:  Lovenox  Code status:  Full code    If patient was admitted under observational status it is with my approval/permission.     At least 55 min was spent on this history and physical.  Time seen:  11:00 p.m. 3/29  Kenyon Larios MD    "

## 2025-03-30 NOTE — PROGRESS NOTES
[facility]  Hospital Medicine Progress Note        CHIEF COMPLAINT   Dysphagia and respiratory secretions     HISTORY OF PRESENT ILLNESS:   Patient is a 65-year-old male with a history of myasthenia gravis on pyridostigmine/prednisone who presented to ER on 03/27/2025 with worsening respiratory status and difficulty swallowing suggesting a acute flare.  Patient was started on Plex treatments on 03/27/2025 and did require intubation 03/27/2025 but has since been extubated today 03/29/2025.  Due to low fibrinogen level plasmapheresis was held today.  Hospitalist consulted for downgrade as patient is now on room air and extubated.      Today's information   Vitals reviewed and stable   White cell count elevated he is on steroids   Rhino virus is positive   Chest x-ray shows atelectasis   Nephrology planning for plasma exchange today     Case was discussed with patient's nurse and  on the floor.    Objective/physical exam:  GENERAL: awake, alert, oriented and in no acute distress, non-toxic appearing   HEENT: normocephalic atraumatic   NECK: supple   LUNGS:  Bilateral rhonchi and coarse breath sounds, no accessory muscle use   CVS: Regular rate and rhythm, normal peripheral perfusion  ABD: Soft, non-tender, non-distended, bowel sounds present  EXTREMITIES: no clubbing or cyanosis  SKIN: Warm, dry.   NEURO: alert and oriented, grossly without focal deficits   PSYCHIATRIC: Cooperative       ASSESSMENT & PLAN:   Myasthenia gravis flare   Acute hypoxemic respiratory failure 2/2 above (extubated 03/29/2025)  Rhinovirus positive  Atelectasis   Leucocytosis- steroid        Plan  Vitals reviewed and stable   White cell count elevated he is on steroids   Rhino virus is positive   Chest x-ray shows atelectasis   Nephrology planning for plasma exchange today   -continue Decadron  -plan for 5 plasmapheresis treatments per Neurology  IS        DVT prophylaxis:  Lovenox  Code status:  Full  code    Critical care time 39 mins  Mg flare on plasma exchange     VITAL SIGNS: 24 HRS MIN & MAX LAST   Temp  Min: 97.5 °F (36.4 °C)  Max: 97.9 °F (36.6 °C) 97.8 °F (36.6 °C)   BP  Min: 100/67  Max: 172/86 (!) 158/78   Pulse  Min: 42  Max: 97  76   Resp  Min: 16  Max: 25 17   SpO2  Min: 94 %  Max: 100 % (!) 94 %     I reviewed the labs below:  Recent Labs   Lab 03/28/25 0401 03/29/25 0233 03/30/25 0445   WBC 8.24 13.81* 20.29*   RBC 4.43* 4.87 5.20   HGB 13.1* 14.5 15.7   HCT 39.8* 43.4 45.4   MCV 89.8 89.1 87.3   MCH 29.6 29.8 30.2   MCHC 32.9* 33.4 34.6   RDW 13.0 13.0 13.4    215 277   MPV 8.8 9.3 8.9     Recent Labs   Lab 03/27/25 2125 03/28/25 0401 03/29/25 0233 03/30/25 0445   NA  --  139 139 141   K  --  3.9 4.2 3.9   CL  --  109* 114* 110*   CO2  --  18* 16* 21*   BUN  --  18.6 22.6 19.0   CREATININE  --  0.76 0.65* 0.63*   CALCIUM  --  8.8 8.7* 8.9   PH 7.450  --   --   --    ALBUMIN  --  5.0* 4.7 5.1*   ALKPHOS  --  29* 12* 25*   ALT  --  9 <5 16   AST  --  9* 5* 17   BILITOT  --  3.8* 2.7* 2.3*     Microbiology Results (last 7 days)       Procedure Component Value Units Date/Time    Blood Culture [[id number]]  (Normal) Collected: 03/27/25 1802    Order Status: Completed Specimen: Blood, Venous Updated: 03/29/25 2001     Blood Culture No Growth At 48 Hours    Blood Culture [[id number]]  (Normal) Collected: 03/27/25 1802    Order Status: Completed Specimen: Blood, Venous Updated: 03/29/25 1901     Blood Culture No Growth At 48 Hours           See below for Radiology    Intake and Output:No intake or output data in the 24 hours ending 03/30/25 1018      All diagnosis and differential diagnosis have been reviewed; assessment and plan has been documented; I have personally reviewed the labs and test results that are presently available; I have reviewed the patients medication list; I have reviewed the consulting providers response and recommendations. I have reviewed or attempted to review  medical records based upon their availability    All of the patient's questions have been  addressed and answered. Patient's is agreeable to the above stated plan. I will continue to monitor closely and make adjustments to medical management as needed.    Portions of this note dictated using EMR integrated voice recognition software, and may be subject to voice recognition errors not corrected at proofreading. Please contact writer for clarification if needed.   _____________________________________________________________________    Nutrition Status:  Nutrition consulted. Most recent weight and BMI monitored-     Measurements:  Wt Readings from Last 1 Encounters:   03/30/25 108 kg (238 lb 1.6 oz)   Body mass index is 34.16 kg/m².    Patient has been screened and assessed by RD.    Malnutrition Type:  Context:    Level: other (see comments) (Does not meet criteria)    Malnutrition Characteristic Summary:  Weight Loss (Malnutrition): other (see comments) (Does not meet criteria)  Fluid Accumulation (Malnutrition): other (see comments) (Not present)    Interventions/Recommendations (treatment strategy):         A minimum of two characteristics is recommended for diagnosis of either severe or non-severe malnutrition.     Current Med:   chlorhexidine  15 mL Mouth/Throat BID    dexAMETHasone injection  2 mg Intravenous Daily    enoxparin  40 mg Subcutaneous Daily    famotidine (PF)  20 mg Intravenous Q12H    mupirocin   Nasal BID      PRN meds:  Current Facility-Administered Medications:     acetaminophen, 650 mg, Oral, Q4H PRN    calcium gluconate IVPB, 1 g, Intravenous, PRN    calcium gluconate IVPB, 2 g, Intravenous, PRN    calcium gluconate IVPB, 3 g, Intravenous, PRN    hydrALAZINE, 10 mg, Intravenous, Q6H PRN    hydrALAZINE, 10 mg, Intravenous, Q4H PRN    melatonin, 6 mg, Oral, Nightly PRN    ondansetron, 4 mg, Intravenous, Q8H PRN    potassium chloride, 40 mEq, Intravenous, PRN    sodium chloride 0.9%, 10 mL,  Intravenous, PRN    sodium phosphate 15 mmol in D5W 250 mL IVPB, 15 mmol, Intravenous, PRN    sodium phosphate 20.1 mmol in D5W 250 mL IVPB, 20.1 mmol, Intravenous, PRN    sodium phosphate 30 mmol in D5W 250 mL IVPB, 30 mmol, Intravenous, PRN    Continuous infusion:       Radiology:   I have personally reviewed the images and agree with radiologist report  X-Ray Chest 1 View  Narrative: EXAMINATION:  XR CHEST 1 VIEW    CLINICAL HISTORY:  respiratory failure;    TECHNIQUE:  One view    COMPARISON:  March 27, 2025..    FINDINGS:  Cardiopericardial silhouette is within normal limits.  Much improved right lower lung lobe opacities.  New left lower lung lobe atelectatic changes.  No pulmonary edema.  No pneumothorax.  No significant fluid within the pleural spaces.  Optimal intubation.  Impression: Improved right lower lung zone atelectasis and new left basilar atelectatic changes.    Electronically signed by: Bjorn Perera  Date:    03/29/2025  Time:    08:23        Chito Nolan MD  Department of Hospital Medicine   [facility]   03/30/2025

## 2025-03-30 NOTE — PROGRESS NOTES
RENAL  Patient is seen and examined  Meds and labs and events reviewed  Extubated now on the floor  Coughing with lot of secretions  Chest x-ray compatible with bibasilar atelectasis    Clinically  Afebrile and hemodynamically stable  Lungs showing bilateral rhonchi and decreased breath sounds of the right base  Regular rate and rhythm  Abdomen soft  No edema      Fibrinogen level 178      Impression  Myasthenia gravis with crisis  Status post respiratory failure      Plan  Pheresis again today  Labs in the a.m.  If pheresis as needed in the a.m. we will replace with a plasma in addition to albumin    Patient needs chest therapy to get rid of his secretions

## 2025-03-30 NOTE — PROGRESS NOTES
03/30/25 1432   Vitals   Temp 97.9 °F (36.6 °C)   Temp Source Oral   Pulse 70   Resp 17   /78   SpO2 97 %   Pulse Oximetry Type Continuous   Device (Oxygen Therapy) room air   Machine Check   Completed? Yes   Machine Serial Number   ([id number])   Model Spectra Optia   Pre-Apheresis   Start Time 1214   Time-Out Yes   2 Patient Identifiers Verified Yes   Consents Verified Yes   Orders on Chart Yes   Physician Available Yes   Line Verified for Use Yes   Pt/Dnr is -American or of Mediterranean  No   OK to proceed per MD order Yes   Disposables   Apheresis Kit Mfg Spectra Optia   Apheresis Kit Exp 12/01/28   Apheresis Fluid/Transfusion   Procedure Type Plasmapheresis   Procedure # 3   Ratio 1.5   Predicted Exchange Data   Replace Volume 4500 mL   Fluid Balance % 100 %   Optia Run Results   AC used 1496   Remove bag 6056   Replacement Used 4416   Bolus 0   Tubing Set -6   Rinse back 168   Fluid balance (mL) 18   Fluid Balance (%) 100   Inlet processed 84152   Plasma volumes exchanged 1.5   Plasma removed 5028   AC in remove bag 1016   AC to patient 480   AC used for prime 19   Saline to Patient due to Air Removal 0   Custom Prime 0   Saline Rinse 0   Post-Apheresis   Finish Time 1432   Access Functioned Well   Reaction None   Tolerance well   Assessments (Pre/Post)   Level of Consciousness (AVPU) alert   Cognitive   Orientation oriented x 4

## 2025-03-31 LAB
ALBUMIN SERPL-MCNC: 4.9 G/DL (ref 3.4–4.8)
ALBUMIN/GLOB SERPL: 6.1 RATIO (ref 1.1–2)
ALP SERPL-CCNC: 15 UNIT/L (ref 40–150)
ALT SERPL-CCNC: 36 UNIT/L (ref 0–55)
ANION GAP SERPL CALC-SCNC: 8 MEQ/L
AST SERPL-CCNC: 25 UNIT/L (ref 11–45)
BASOPHILS # BLD AUTO: 0.02 X10(3)/MCL
BASOPHILS NFR BLD AUTO: 0.1 %
BILIRUB SERPL-MCNC: 2.7 MG/DL
BUN SERPL-MCNC: 21.5 MG/DL (ref 8.4–25.7)
CALCIUM SERPL-MCNC: 8.6 MG/DL (ref 8.8–10)
CHLORIDE SERPL-SCNC: 113 MMOL/L (ref 98–107)
CO2 SERPL-SCNC: 18 MMOL/L (ref 23–31)
CREAT SERPL-MCNC: 0.73 MG/DL (ref 0.72–1.25)
CREAT/UREA NIT SERPL: 29
EOSINOPHIL # BLD AUTO: 0.04 X10(3)/MCL (ref 0–0.9)
EOSINOPHIL NFR BLD AUTO: 0.2 %
ERYTHROCYTE [DISTWIDTH] IN BLOOD BY AUTOMATED COUNT: 13.9 % (ref 11.5–17)
FIBRINOGEN PPP-MCNC: 130 MG/DL (ref 210–463)
GFR SERPLBLD CREATININE-BSD FMLA CKD-EPI: >60 ML/MIN/1.73/M2
GLOBULIN SER-MCNC: 0.8 GM/DL (ref 2.4–3.5)
GLUCOSE SERPL-MCNC: 122 MG/DL (ref 82–115)
HCT VFR BLD AUTO: 48.7 % (ref 42–52)
HGB BLD-MCNC: 16.4 G/DL (ref 14–18)
IMM GRANULOCYTES # BLD AUTO: 0.09 X10(3)/MCL (ref 0–0.04)
IMM GRANULOCYTES NFR BLD AUTO: 0.5 %
LYMPHOCYTES # BLD AUTO: 2.26 X10(3)/MCL (ref 0.6–4.6)
LYMPHOCYTES NFR BLD AUTO: 13.7 %
MCH RBC QN AUTO: 29.7 PG (ref 27–31)
MCHC RBC AUTO-ENTMCNC: 33.7 G/DL (ref 33–36)
MCV RBC AUTO: 88.2 FL (ref 80–94)
MONOCYTES # BLD AUTO: 1.98 X10(3)/MCL (ref 0.1–1.3)
MONOCYTES NFR BLD AUTO: 12 %
NEUTROPHILS # BLD AUTO: 12.15 X10(3)/MCL (ref 2.1–9.2)
NEUTROPHILS NFR BLD AUTO: 73.5 %
NRBC BLD AUTO-RTO: 0 %
PLATELET # BLD AUTO: 230 X10(3)/MCL (ref 130–400)
PMV BLD AUTO: 8.8 FL (ref 7.4–10.4)
POTASSIUM SERPL-SCNC: 4.3 MMOL/L (ref 3.5–5.1)
PROT SERPL-MCNC: 5.7 GM/DL (ref 5.8–7.6)
RBC # BLD AUTO: 5.52 X10(6)/MCL (ref 4.7–6.1)
SODIUM SERPL-SCNC: 139 MMOL/L (ref 136–145)
WBC # BLD AUTO: 16.54 X10(3)/MCL (ref 4.5–11.5)

## 2025-03-31 PROCEDURE — 85384 FIBRINOGEN ACTIVITY: CPT | Performed by: INTERNAL MEDICINE

## 2025-03-31 PROCEDURE — 85025 COMPLETE CBC W/AUTO DIFF WBC: CPT

## 2025-03-31 PROCEDURE — 21400001 HC TELEMETRY ROOM

## 2025-03-31 PROCEDURE — 25000003 PHARM REV CODE 250: Performed by: INTERNAL MEDICINE

## 2025-03-31 PROCEDURE — 63600175 PHARM REV CODE 636 W HCPCS

## 2025-03-31 PROCEDURE — 94799 UNLISTED PULMONARY SVC/PX: CPT

## 2025-03-31 PROCEDURE — 80053 COMPREHEN METABOLIC PANEL: CPT

## 2025-03-31 PROCEDURE — 25000003 PHARM REV CODE 250

## 2025-03-31 PROCEDURE — 36415 COLL VENOUS BLD VENIPUNCTURE: CPT

## 2025-03-31 PROCEDURE — 99232 SBSQ HOSP IP/OBS MODERATE 35: CPT | Mod: ,,, | Performed by: SPECIALIST

## 2025-03-31 PROCEDURE — 63600175 PHARM REV CODE 636 W HCPCS: Mod: JZ | Performed by: INTERNAL MEDICINE

## 2025-03-31 PROCEDURE — 99231 SBSQ HOSP IP/OBS SF/LOW 25: CPT | Mod: ,,, | Performed by: INTERNAL MEDICINE

## 2025-03-31 RX ORDER — CIPROFLOXACIN AND DEXAMETHASONE 3; 1 MG/ML; MG/ML
4 SUSPENSION/ DROPS AURICULAR (OTIC) 2 TIMES DAILY
Status: DISCONTINUED | OUTPATIENT
Start: 2025-03-31 | End: 2025-04-02 | Stop reason: HOSPADM

## 2025-03-31 RX ORDER — KETOROLAC TROMETHAMINE 30 MG/ML
15 INJECTION, SOLUTION INTRAMUSCULAR; INTRAVENOUS EVERY 6 HOURS PRN
Status: DISCONTINUED | OUTPATIENT
Start: 2025-03-31 | End: 2025-04-02 | Stop reason: HOSPADM

## 2025-03-31 RX ORDER — CALCIUM GLUCONATE 20 MG/ML
1 INJECTION, SOLUTION INTRAVENOUS ONCE
Status: COMPLETED | OUTPATIENT
Start: 2025-03-31 | End: 2025-03-31

## 2025-03-31 RX ADMIN — CIPROFLOXACIN AND DEXAMETHASONE 4 DROP: 3; 1 SUSPENSION/ DROPS AURICULAR (OTIC) at 08:03

## 2025-03-31 RX ADMIN — KETOROLAC TROMETHAMINE 15 MG: 30 INJECTION, SOLUTION INTRAMUSCULAR; INTRAVENOUS at 08:03

## 2025-03-31 RX ADMIN — MUPIROCIN: 20 OINTMENT TOPICAL at 08:03

## 2025-03-31 RX ADMIN — CHLORHEXIDINE GLUCONATE 0.12% ORAL RINSE 15 ML: 1.2 LIQUID ORAL at 08:03

## 2025-03-31 RX ADMIN — Medication 9 MG: at 08:03

## 2025-03-31 RX ADMIN — CIPROFLOXACIN AND DEXAMETHASONE 4 DROP: 3; 1 SUSPENSION/ DROPS AURICULAR (OTIC) at 11:03

## 2025-03-31 RX ADMIN — FAMOTIDINE 20 MG: 10 INJECTION, SOLUTION INTRAVENOUS at 08:03

## 2025-03-31 RX ADMIN — ENOXAPARIN SODIUM 40 MG: 40 INJECTION SUBCUTANEOUS at 05:03

## 2025-03-31 RX ADMIN — CALCIUM GLUCONATE 1 G: 20 INJECTION, SOLUTION INTRAVENOUS at 11:03

## 2025-03-31 RX ADMIN — KETOROLAC TROMETHAMINE 15 MG: 30 INJECTION, SOLUTION INTRAMUSCULAR; INTRAVENOUS at 11:03

## 2025-03-31 RX ADMIN — POTASSIUM PHOSPHATE, MONOBASIC AND POTASSIUM PHOSPHATE, DIBASIC 15 MMOL: 224; 236 INJECTION, SOLUTION, CONCENTRATE INTRAVENOUS at 12:03

## 2025-03-31 NOTE — PROGRESS NOTES
"Did ok over weekend   got 3rd exch yest       Extub 3.29  Called to check on him over weekend but I did not come to see him     Wife at bedside this am     He is complaining of secretions and ear discomfort   ...  Exam:   BP (!) 154/85   Pulse 85   Temp 97.8 °F (36.6 °C) (Oral)   Resp 20   Ht 5' 10" (1.778 m)   Wt 108 kg (238 lb 1.6 oz)   SpO2 95%   BMI 34.16 kg/m²   Alert   Sitting in bedside chair   Voice strong   No ptosis and eom's or gaze at least normal   Can almost puff cheeks and can place tongue in cheek       Labs: fibrinogen today 130  His rhinovirus PCR test had been positive     Imaging: cxr report 3.29 reviewed      chlorhexidine  15 mL Mouth/Throat BID    enoxparin  40 mg Subcutaneous Daily    famotidine (PF)  20 mg Intravenous Q12H    melatonin  9 mg Oral Nightly    mupirocin   Nasal BID        Prob list:   MG exac in setting of rhinovirus   Diabetes     Plan/Rec's  Await 5 PLEX trt please   I would like him to get meningococcal vaccine while here given likelihood I will ask him to take complement inh treatment in weeks or months ahead (Ultomiris or similar)   I stopped iv decadron   Should be able to take oral prednisone soon 10mg daily please once taking PO   I'd be ok with any kind of pain reliever for ear pressure [if hospitalist wants to order] but we should avoid anything anticholinergic       "

## 2025-03-31 NOTE — PROGRESS NOTES
Renal  Patient is seen and examined  Meds and labs and events reviewed  Lungs rhonchi  Regular rate and rhythm  Abdomen is soft  No edema    Fibrinogen less than 150    Impression/PLAN  Myasthenia gravis exacerbation requiring blood by exchange  In view of the low fibrinogen today await till tomorrow  If we need to do the 4th exchange tomorrow we may need to mix with fresh frozen plasma if fibrinogen remains low

## 2025-03-31 NOTE — PROGRESS NOTES
[facility]  Hospital Medicine Progress Note        CHIEF COMPLAINT   Dysphagia and respiratory secretions     HISTORY OF PRESENT ILLNESS:   Patient is a 65-year-old male with a history of myasthenia gravis on pyridostigmine/prednisone who presented to ER on 03/27/2025 with worsening respiratory status and difficulty swallowing suggesting a acute flare.  Patient was started on Plex treatments on 03/27/2025 and did require intubation 03/27/2025 but has since been extubated today 03/29/2025.  Due to low fibrinogen level plasmapheresis was held today.  Hospitalist consulted for downgrade as patient is now on room air and extubated.      Patient currently being managed for myasthenia gravis crisis on plasma exchange therapy.  Neurology and Nephrology on board.  Has completed 3 cycles of plasma exchange.    Today's information   Vitals reviewed and stable   Vitals reviewed and stable   White cell count elevated but patient is on steroid   Fibrinogen levels of 130, low   Blood cultures negative at 72 hours   Patient complaining of ear pressure will begin on Ciprodex ear drop       Case was discussed with patient's nurse and  on the floor.    Objective/physical exam:  GENERAL: awake, alert, oriented and in no acute distress, non-toxic appearing   HEENT: normocephalic atraumatic   NECK: supple   LUNGS:  Bilateral rhonchi and coarse breath sounds, no accessory muscle use   CVS: Regular rate and rhythm, normal peripheral perfusion  ABD: Soft, non-tender, non-distended, bowel sounds present  EXTREMITIES: no clubbing or cyanosis  SKIN: Warm, dry.   NEURO: alert and oriented, grossly without focal deficits   PSYCHIATRIC: Cooperative       ASSESSMENT & PLAN:   Myasthenia gravis flare   Acute hypoxemic respiratory failure 2/2 above (extubated 03/29/2025)  Rhinovirus positive  Atelectasis   Leucocytosis- steroid        Plan  Vitals reviewed and stable   White cell count elevated he is on  steroids   Patient complaining of ear pressure will begin on Ciprodex ear drop   Rhino virus is positive   Chest x-ray shows atelectasis   Follow up with Nephrology recs on plasma exchange, fibrinogen level is low today.    Follow up Neurology recommendations Decadron has been discontinued plans for p.o. steroids once he complaints can not plasma exchange  plan for 5 plasmapheresis treatments per Neurology  IS        DVT prophylaxis:  Lovenox  Code status:  Full code    Critical care time 39 mins  Mg flare on plasma exchange     VITAL SIGNS: 24 HRS MIN & MAX LAST   Temp  Min: 97.5 °F (36.4 °C)  Max: 98.2 °F (36.8 °C) 98 °F (36.7 °C)   BP  Min: 112/65  Max: 154/85 132/72   Pulse  Min: 65  Max: 85  74   Resp  Min: 16  Max: 20 16   SpO2  Min: 94 %  Max: 95 % (!) 94 %     I reviewed the labs below:  Recent Labs   Lab 03/29/25 0233 03/30/25 0445 03/31/25 0415   WBC 13.81* 20.29* 16.54*   RBC 4.87 5.20 5.52   HGB 14.5 15.7 16.4   HCT 43.4 45.4 48.7   MCV 89.1 87.3 88.2   MCH 29.8 30.2 29.7   MCHC 33.4 34.6 33.7   RDW 13.0 13.4 13.9    277 230   MPV 9.3 8.9 8.8     Recent Labs   Lab 03/27/25 2125 03/28/25  0401 03/29/25  0233 03/30/25 0445 03/31/25  0415   NA  --    < > 139 141 139   K  --    < > 4.2 3.9 4.3   CL  --    < > 114* 110* 113*   CO2  --    < > 16* 21* 18*   BUN  --    < > 22.6 19.0 21.5   CREATININE  --    < > 0.65* 0.63* 0.73   CALCIUM  --    < > 8.7* 8.9 8.6*   PH 7.450  --   --   --   --    ALBUMIN  --    < > 4.7 5.1* 4.9*   ALKPHOS  --    < > 12* 25* 15*   ALT  --    < > <5 16 36   AST  --    < > 5* 17 25   BILITOT  --    < > 2.7* 2.3* 2.7*    < > = values in this interval not displayed.     Microbiology Results (last 7 days)       Procedure Component Value Units Date/Time    Blood Culture [[id number]]  (Normal) Collected: 03/27/25 1802    Order Status: Completed Specimen: Blood, Venous Updated: 03/30/25 2002     Blood Culture No Growth At 72 Hours    Blood Culture [[id number]]  (Normal)  Collected: 03/27/25 1802    Order Status: Completed Specimen: Blood, Venous Updated: 03/30/25 1901     Blood Culture No Growth At 72 Hours           See below for Radiology    Intake and Output:  Intake/Output Summary (Last 24 hours) at 3/31/2025 1451  Last data filed at 3/31/2025 1348  Gross per 24 hour   Intake 837 ml   Output --   Net 837 ml         All diagnosis and differential diagnosis have been reviewed; assessment and plan has been documented; I have personally reviewed the labs and test results that are presently available; I have reviewed the patients medication list; I have reviewed the consulting providers response and recommendations. I have reviewed or attempted to review medical records based upon their availability    All of the patient's questions have been  addressed and answered. Patient's is agreeable to the above stated plan. I will continue to monitor closely and make adjustments to medical management as needed.    Portions of this note dictated using EMR integrated voice recognition software, and may be subject to voice recognition errors not corrected at proofreading. Please contact writer for clarification if needed.   _____________________________________________________________________    Nutrition Status:  Nutrition consulted. Most recent weight and BMI monitored-     Measurements:  Wt Readings from Last 1 Encounters:   03/30/25 108 kg (238 lb 1.6 oz)   Body mass index is 34.16 kg/m².    Patient has been screened and assessed by RD.    Malnutrition Type:  Context:    Level: other (see comments) (Does not meet criteria)    Malnutrition Characteristic Summary:  Weight Loss (Malnutrition): other (see comments) (Does not meet criteria)  Fluid Accumulation (Malnutrition): other (see comments) (Not present)    Interventions/Recommendations (treatment strategy):         A minimum of two characteristics is recommended for diagnosis of either severe or non-severe malnutrition.     Current Med:    0.9%  NaCl infusion (for blood administration)   Intravenous Once in dialysis    chlorhexidine  15 mL Mouth/Throat BID    ciprofloxacin-dexAMETHasone 0.3-0.1%  4 drop Both Ears BID    enoxparin  40 mg Subcutaneous Daily    famotidine (PF)  20 mg Intravenous Q12H    melatonin  9 mg Oral Nightly    mupirocin   Nasal BID    potassium phosphate IVPB  15 mmol Intravenous Once      PRN meds:  Current Facility-Administered Medications:     acetaminophen, 650 mg, Oral, Q4H PRN    calcium gluconate IVPB, 1 g, Intravenous, PRN    calcium gluconate IVPB, 2 g, Intravenous, PRN    calcium gluconate IVPB, 3 g, Intravenous, PRN    hydrALAZINE, 10 mg, Intravenous, Q6H PRN    hydrALAZINE, 10 mg, Intravenous, Q4H PRN    ketorolac, 15 mg, Intravenous, Q6H PRN    ondansetron, 4 mg, Intravenous, Q8H PRN    potassium chloride, 40 mEq, Intravenous, PRN    sodium chloride 0.9%, 10 mL, Intravenous, PRN    sodium phosphate 15 mmol in D5W 250 mL IVPB, 15 mmol, Intravenous, PRN    sodium phosphate 20.1 mmol in D5W 250 mL IVPB, 20.1 mmol, Intravenous, PRN    sodium phosphate 30 mmol in D5W 250 mL IVPB, 30 mmol, Intravenous, PRN    Continuous infusion:       Radiology:   I have personally reviewed the images and agree with radiologist report  X-Ray Chest 1 View  Narrative: EXAMINATION:  XR CHEST 1 VIEW    CLINICAL HISTORY:  respiratory failure;    TECHNIQUE:  One view    COMPARISON:  March 27, 2025..    FINDINGS:  Cardiopericardial silhouette is within normal limits.  Much improved right lower lung lobe opacities.  New left lower lung lobe atelectatic changes.  No pulmonary edema.  No pneumothorax.  No significant fluid within the pleural spaces.  Optimal intubation.  Impression: Improved right lower lung zone atelectasis and new left basilar atelectatic changes.    Electronically signed by: Bjorn Perera  Date:    03/29/2025  Time:    08:23        Chito Nolan MD  Department of Hospital Medicine   [facility]    03/31/2025

## 2025-04-01 LAB
ALBUMIN SERPL-MCNC: 4.5 G/DL (ref 3.4–4.8)
ALBUMIN/GLOB SERPL: 3.2 RATIO (ref 1.1–2)
ALP SERPL-CCNC: 27 UNIT/L (ref 40–150)
ALT SERPL-CCNC: 41 UNIT/L (ref 0–55)
ANION GAP SERPL CALC-SCNC: 7 MEQ/L
AST SERPL-CCNC: 16 UNIT/L (ref 11–45)
BACTERIA BLD CULT: NORMAL
BACTERIA BLD CULT: NORMAL
BASOPHILS # BLD AUTO: 0.02 X10(3)/MCL
BASOPHILS NFR BLD AUTO: 0.1 %
BILIRUB SERPL-MCNC: 2.4 MG/DL
BUN SERPL-MCNC: 19.9 MG/DL (ref 8.4–25.7)
CALCIUM SERPL-MCNC: 8.8 MG/DL (ref 8.8–10)
CHLORIDE SERPL-SCNC: 110 MMOL/L (ref 98–107)
CO2 SERPL-SCNC: 25 MMOL/L (ref 23–31)
CREAT SERPL-MCNC: 0.69 MG/DL (ref 0.72–1.25)
CREAT/UREA NIT SERPL: 29
EOSINOPHIL # BLD AUTO: 0.45 X10(3)/MCL (ref 0–0.9)
EOSINOPHIL NFR BLD AUTO: 3.1 %
ERYTHROCYTE [DISTWIDTH] IN BLOOD BY AUTOMATED COUNT: 13.9 % (ref 11.5–17)
FIBRINOGEN PPP-MCNC: 272 MG/DL (ref 210–463)
GFR SERPLBLD CREATININE-BSD FMLA CKD-EPI: >60 ML/MIN/1.73/M2
GLOBULIN SER-MCNC: 1.4 GM/DL (ref 2.4–3.5)
GLUCOSE SERPL-MCNC: 98 MG/DL (ref 82–115)
HCT VFR BLD AUTO: 45.3 % (ref 42–52)
HGB BLD-MCNC: 14.8 G/DL (ref 14–18)
IMM GRANULOCYTES # BLD AUTO: 0.08 X10(3)/MCL (ref 0–0.04)
IMM GRANULOCYTES NFR BLD AUTO: 0.5 %
INR PPP: 1.1
LYMPHOCYTES # BLD AUTO: 2.73 X10(3)/MCL (ref 0.6–4.6)
LYMPHOCYTES NFR BLD AUTO: 18.5 %
MAGNESIUM SERPL-MCNC: 2.1 MG/DL (ref 1.6–2.6)
MCH RBC QN AUTO: 29.6 PG (ref 27–31)
MCHC RBC AUTO-ENTMCNC: 32.7 G/DL (ref 33–36)
MCV RBC AUTO: 90.6 FL (ref 80–94)
MONOCYTES # BLD AUTO: 1.16 X10(3)/MCL (ref 0.1–1.3)
MONOCYTES NFR BLD AUTO: 7.9 %
NEUTROPHILS # BLD AUTO: 10.28 X10(3)/MCL (ref 2.1–9.2)
NEUTROPHILS NFR BLD AUTO: 69.9 %
NRBC BLD AUTO-RTO: 0 %
PHOSPHATE SERPL-MCNC: 3.2 MG/DL (ref 2.3–4.7)
PLATELET # BLD AUTO: 191 X10(3)/MCL (ref 130–400)
PMV BLD AUTO: 9.1 FL (ref 7.4–10.4)
POTASSIUM SERPL-SCNC: 4 MMOL/L (ref 3.5–5.1)
PROT SERPL-MCNC: 5.9 GM/DL (ref 5.8–7.6)
PROTHROMBIN TIME: 13.7 SECONDS (ref 12.5–14.5)
RBC # BLD AUTO: 5 X10(6)/MCL (ref 4.7–6.1)
SODIUM SERPL-SCNC: 142 MMOL/L (ref 136–145)
WBC # BLD AUTO: 14.72 X10(3)/MCL (ref 4.5–11.5)

## 2025-04-01 PROCEDURE — 63600175 PHARM REV CODE 636 W HCPCS: Mod: JZ | Performed by: INTERNAL MEDICINE

## 2025-04-01 PROCEDURE — 85025 COMPLETE CBC W/AUTO DIFF WBC: CPT

## 2025-04-01 PROCEDURE — 36514 APHERESIS PLASMA: CPT

## 2025-04-01 PROCEDURE — 36415 COLL VENOUS BLD VENIPUNCTURE: CPT

## 2025-04-01 PROCEDURE — 25000003 PHARM REV CODE 250: Performed by: INTERNAL MEDICINE

## 2025-04-01 PROCEDURE — 63600175 PHARM REV CODE 636 W HCPCS: Performed by: SPECIALIST

## 2025-04-01 PROCEDURE — 63600175 PHARM REV CODE 636 W HCPCS

## 2025-04-01 PROCEDURE — 85610 PROTHROMBIN TIME: CPT | Performed by: INTERNAL MEDICINE

## 2025-04-01 PROCEDURE — 80053 COMPREHEN METABOLIC PANEL: CPT

## 2025-04-01 PROCEDURE — 83735 ASSAY OF MAGNESIUM: CPT | Performed by: INTERNAL MEDICINE

## 2025-04-01 PROCEDURE — 84100 ASSAY OF PHOSPHORUS: CPT | Performed by: INTERNAL MEDICINE

## 2025-04-01 PROCEDURE — P9045 ALBUMIN (HUMAN), 5%, 250 ML: HCPCS | Mod: JZ | Performed by: INTERNAL MEDICINE

## 2025-04-01 PROCEDURE — 99231 SBSQ HOSP IP/OBS SF/LOW 25: CPT | Mod: ,,, | Performed by: INTERNAL MEDICINE

## 2025-04-01 PROCEDURE — 21400001 HC TELEMETRY ROOM

## 2025-04-01 PROCEDURE — 85384 FIBRINOGEN ACTIVITY: CPT | Performed by: INTERNAL MEDICINE

## 2025-04-01 PROCEDURE — 25000003 PHARM REV CODE 250

## 2025-04-01 RX ORDER — CALCIUM GLUCONATE 20 MG/ML
1 INJECTION, SOLUTION INTRAVENOUS
Status: COMPLETED | OUTPATIENT
Start: 2025-04-01 | End: 2025-04-01

## 2025-04-01 RX ORDER — AMOXICILLIN AND CLAVULANATE POTASSIUM 875; 125 MG/1; MG/1
1 TABLET, FILM COATED ORAL EVERY 12 HOURS
Status: DISCONTINUED | OUTPATIENT
Start: 2025-04-01 | End: 2025-04-02 | Stop reason: HOSPADM

## 2025-04-01 RX ORDER — ALBUMIN HUMAN 50 G/1000ML
225 SOLUTION INTRAVENOUS ONCE
Status: COMPLETED | OUTPATIENT
Start: 2025-04-01 | End: 2025-04-01

## 2025-04-01 RX ORDER — PREDNISONE 20 MG/1
20 TABLET ORAL DAILY
Status: DISCONTINUED | OUTPATIENT
Start: 2025-04-01 | End: 2025-04-02 | Stop reason: HOSPADM

## 2025-04-01 RX ADMIN — CALCIUM GLUCONATE 1 G: 20 INJECTION, SOLUTION INTRAVENOUS at 11:04

## 2025-04-01 RX ADMIN — PREDNISONE 20 MG: 20 TABLET ORAL at 08:04

## 2025-04-01 RX ADMIN — MUPIROCIN: 20 OINTMENT TOPICAL at 08:04

## 2025-04-01 RX ADMIN — Medication 9 MG: at 09:04

## 2025-04-01 RX ADMIN — KETOROLAC TROMETHAMINE 15 MG: 30 INJECTION, SOLUTION INTRAMUSCULAR; INTRAVENOUS at 05:04

## 2025-04-01 RX ADMIN — CIPROFLOXACIN AND DEXAMETHASONE 4 DROP: 3; 1 SUSPENSION/ DROPS AURICULAR (OTIC) at 09:04

## 2025-04-01 RX ADMIN — ALBUMIN HUMAN 225 G: 50 SOLUTION INTRAVENOUS at 10:04

## 2025-04-01 RX ADMIN — FAMOTIDINE 20 MG: 10 INJECTION, SOLUTION INTRAVENOUS at 09:04

## 2025-04-01 RX ADMIN — CIPROFLOXACIN AND DEXAMETHASONE 4 DROP: 3; 1 SUSPENSION/ DROPS AURICULAR (OTIC) at 08:04

## 2025-04-01 RX ADMIN — KETOROLAC TROMETHAMINE 15 MG: 30 INJECTION, SOLUTION INTRAMUSCULAR; INTRAVENOUS at 08:04

## 2025-04-01 RX ADMIN — CHLORHEXIDINE GLUCONATE 0.12% ORAL RINSE 15 ML: 1.2 LIQUID ORAL at 08:04

## 2025-04-01 RX ADMIN — CALCIUM GLUCONATE 1 G: 20 INJECTION, SOLUTION INTRAVENOUS at 10:04

## 2025-04-01 RX ADMIN — AMOXICILLIN AND CLAVULANATE POTASSIUM 1 TABLET: 875; 125 TABLET, FILM COATED ORAL at 09:04

## 2025-04-01 RX ADMIN — FAMOTIDINE 20 MG: 10 INJECTION, SOLUTION INTRAVENOUS at 08:04

## 2025-04-01 RX ADMIN — ENOXAPARIN SODIUM 40 MG: 40 INJECTION SUBCUTANEOUS at 04:04

## 2025-04-01 RX ADMIN — CHLORHEXIDINE GLUCONATE 0.12% ORAL RINSE 15 ML: 1.2 LIQUID ORAL at 09:04

## 2025-04-01 NOTE — PROGRESS NOTES
Inpatient Nutrition Assessment    Admit Date: 3/27/2025   Total duration of encounter: 5 days   Patient Age: 65 y.o.    Nutrition Recommendation/Prescription   Diet Adult Regular ordered   Start tube feeding when appropriate.  Tube feeding recommendation:     Peptamen Intense VHP goal rate 50 ml/hr + 3 packets ProSource TF20 daily to provide  1240 kcal/d  (72% est needs, 100% est needs)  153 g protein/d (101% est needs)  840 ml free water/d (34% est need)  (calculations based on estimated 20 hr/d run time)     If no IV fluids running, can give 150ml q 2hr water flushes. Total water provided: 2340ml (96% est needs.)     Start @ 20ml/hr, increase as tolerated 20ml/hr q4hr until goal rate reached.      Communication of Recommendations: reviewed with nurse    Nutrition Assessment     Malnutrition Assessment/Nutrition-Focused Physical Exam       Malnutrition Level: other (see comments) (Does not meet criteria) (03/28/25 1051)     Weight Loss (Malnutrition): other (see comments) (Does not meet criteria) (03/28/25 1051)                                         Fluid Accumulation (Malnutrition): other (see comments) (Not present) (03/28/25 1051)        A minimum of two characteristics is recommended for diagnosis of either severe or non-severe malnutrition.    Chart Review    Reason Seen: continuous nutrition monitoring and follow-up    Malnutrition Screening Tool Results   Have you recently lost weight without trying?: Yes: 34 lbs or more  Have you been eating poorly because of a decreased appetite?: Yes   MST Score: 5   Diagnosis:  Myasthenia gravis crisis     Relevant Medical History: myasthenia gravis     Scheduled Medications:  0.9%  NaCl infusion (for blood administration), , Once in dialysis  chlorhexidine, 15 mL, BID  ciprofloxacin-dexAMETHasone 0.3-0.1%, 4 drop, BID  enoxparin, 40 mg, Daily  famotidine (PF), 20 mg, Q12H  melatonin, 9 mg, Nightly  mupirocin, , BID  predniSONE, 20 mg, Daily    Continuous Infusions:      PRN Medications:  acetaminophen, 650 mg, Q4H PRN  calcium gluconate IVPB, 1 g, PRN  calcium gluconate IVPB, 2 g, PRN  calcium gluconate IVPB, 3 g, PRN  hydrALAZINE, 10 mg, Q6H PRN  hydrALAZINE, 10 mg, Q4H PRN  ketorolac, 15 mg, Q6H PRN  ondansetron, 4 mg, Q8H PRN  potassium chloride, 40 mEq, PRN  sodium chloride 0.9%, 10 mL, PRN  sodium phosphate 15 mmol in D5W 250 mL IVPB, 15 mmol, PRN  sodium phosphate 20.1 mmol in D5W 250 mL IVPB, 20.1 mmol, PRN  sodium phosphate 30 mmol in D5W 250 mL IVPB, 30 mmol, PRN    Calorie Containing IV Medications: no significant kcals from medications at this time    Recent Labs   Lab 03/27/25  1034 03/28/25  0401 03/29/25  0233 03/30/25  0445 03/31/25  0415 04/01/25  0557 04/01/25  0558    139 139 141 139  --  142   K 3.9 3.9 4.2 3.9 4.3  --  4.0   CALCIUM 9.8 8.8 8.7* 8.9 8.6*  --  8.8   PHOS  --   --   --  1.9*  --   --  3.2   MG  --   --   --   --   --   --  2.10    109* 114* 110* 113*  --  110*   CO2 26 18* 16* 21* 18*  --  25   BUN 18.2 18.6 22.6 19.0 21.5  --  19.9   CREATININE 0.79 0.76 0.65* 0.63* 0.73  --  0.69*   EGFRNORACEVR >60 >60 >60 >60 >60  --  >60   GLUCOSE 104 108 146* 105 122*  --  98   BILITOT 3.0* 3.8* 2.7* 2.3* 2.7*  --  2.4*   ALKPHOS 80 29* 12* 25* 15*  --  27*   ALT 23 9 <5 16 36  --  41   AST 16 9* 5* 17 25  --  16   ALBUMIN 4.1 5.0* 4.7 5.1* 4.9*  --  4.5   WBC 17.35* 8.24 13.81* 20.29* 16.54* 14.72*  --    HGB 16.2 13.1* 14.5 15.7 16.4 14.8  --    HCT 48.2 39.8* 43.4 45.4 48.7 45.3  --      Nutrition Orders:  Diet Adult Regular      Appetite/Oral Intake: good/% of meals  Factors Affecting Nutritional Intake: none identified  Social Needs Impacting Access to Food: unable to assess at this time; will attempt on follow-up  Food/[employer]/Cultural Preferences: unable to obtain  Food Allergies: no known food allergies  Last Bowel Movement: 03/29/25  Wound(s):  None documented     Comments    3/28/25: Discussed with RN. Will provide tube  "feeding recommendations for when appropriate to start tube feeding. Receiving kcal from meds.  Noted MST indicates wt loss, unable to verify at this time. Noted previous EMR wts indicate wt stable. Possible 5# wt loss since August 2024. Plans for several rounds of plasmapheresis.     4/1/2025: Pt gone at time of visit, the CNA reports a good appetite/PO intake and denies N/V. Last BM noted. Will monitor.    Anthropometrics    Height: 5' 10" (177.8 cm), Height Method: Stated  Last Weight: 108 kg (238 lb 1.6 oz) (03/30/25 0446), Weight Method: Bed Scale  BMI (Calculated): 34.2  BMI Classification: obese grade I (BMI 30-34.9)        Ideal Body Weight (IBW), Male: 166 lb     % Ideal Body Weight, Male (lb): 143.43 %                          Usual Weight Provided By: unable to obtain usual weight    Wt Readings from Last 5 Encounters:   03/30/25 108 kg (238 lb 1.6 oz)   02/04/25 122.5 kg (270 lb)   01/24/25 122.5 kg (270 lb)   10/08/24 124.7 kg (275 lb)   08/28/24 124.7 kg (275 lb)   3/30/2025: 108 kg  Weight Change(s) Since Admission:   Wt Readings from Last 1 Encounters:   03/30/25 0446 108 kg (238 lb 1.6 oz)   03/27/25 1012 122.5 kg (270 lb)   Admit Weight: 122.5 kg (270 lb) (03/27/25 1012), Weight Method: Bed Scale    Estimated Needs    Weight Used For Calorie Calculations: 122.5 kg (270 lb 1 oz)  Energy Calorie Requirements (kcal): 1348-1715kcal (11-14kcal/kg)  Energy Need Method: Kcal/kg  Weight Used For Protein Calculations: 75.4 kg (166 lb 5.4 oz)  Protein Requirements: 151gm (2g/kg IBW)  Fluid Requirements (mL): 2450ml (20ml/kg)  CHO Requirement: 190gm (45% est needs)     Enteral Nutrition     Patient not receiving enteral nutrition at this time.    Parenteral Nutrition     Patient not receiving parenteral nutrition support at this time.    Evaluation of Received Nutrient Intake    Calories: meeting estimated needs  Protein: meeting estimated needs    Patient Education     Not applicable.    Nutrition Diagnosis "     PES: Inadequate oral intake related to acute illness as evidenced by intubation since admit. (active)     PES:            Nutrition Interventions     Intervention(s): modified composition of enteral nutrition, modified rate of enteral nutrition, and collaboration with other providers  Intervention(s):      Goal: Meet greater than 80% of nutritional needs by follow-up. (goal progressing)  Goal: Tolerate enteral feeding at goal rate by follow-up. (goal progressing)    Nutrition Goals & Monitoring     Dietitian will monitor: energy intake and enteral nutrition intake  Discharge planning: too early to determine; pending clinical course  Nutrition Risk/Follow-Up: high (follow-up in 1-4 days)   Please consult if re-assessment needed sooner.

## 2025-04-01 NOTE — PROGRESS NOTES
[facility]  Hospital Medicine Progress Note        CHIEF COMPLAINT   Dysphagia and respiratory secretions     HISTORY OF PRESENT ILLNESS:   Patient is a 65-year-old male with a history of myasthenia gravis on pyridostigmine/prednisone who presented to ER on 03/27/2025 with worsening respiratory status and difficulty swallowing suggesting a acute flare.  Patient was started on Plex treatments on 03/27/2025 and did require intubation 03/27/2025 but has since been extubated today 03/29/2025.  Due to low fibrinogen level plasmapheresis was held today.  Hospitalist consulted for downgrade as patient is now on room air and extubated.      Patient currently being managed for myasthenia gravis crisis on plasma exchange therapy.  Neurology and Nephrology on board.  Has completed 3 cycles of plasma exchange.    Today's information   Vitals reviewed and stable   White cell count elevated but patient is on steroid   Fibrinogen level of 272, nephrology will pharese today    Case was discussed with patient's nurse and  on the floor.    Objective/physical exam:  GENERAL: awake, alert, oriented and in no acute distress, non-toxic appearing   HEENT: normocephalic atraumatic   NECK: supple   LUNGS:  Bilateral rhonchi and coarse breath sounds, no accessory muscle use   CVS: Regular rate and rhythm, normal peripheral perfusion  ABD: Soft, non-tender, non-distended, bowel sounds present  EXTREMITIES: no clubbing or cyanosis  SKIN: Warm, dry.   NEURO: alert and oriented, grossly without focal deficits   PSYCHIATRIC: Cooperative       ASSESSMENT & PLAN:   Myasthenia gravis flare   Acute hypoxemic respiratory failure 2/2 above (extubated 03/29/2025)  Rhinovirus positive  Atelectasis   Leucocytosis- steroid        Plan  Vitals reviewed and stable   White cell count elevated he is on steroids   Patient complaining of ear pressure will begin on Ciprodex ear drop , day 2  Rhino virus is positive    Chest x-ray shows atelectasis   Follow up with Nephrology recs on plasma exchange, for pharesis today, dose 4/5  Follow up Neurology recommendations Decadron has been discontinued plans for p.o. steroids once he complaints can not plasma exchange  plan for 5 plasmapheresis treatments per Neurology  IS        DVT prophylaxis:  Lovenox  Code status:  Full code    Critical care time 39 mins  Mg flare on plasma exchange     VITAL SIGNS: 24 HRS MIN & MAX LAST   Temp  Min: 97.5 °F (36.4 °C)  Max: 98.3 °F (36.8 °C) 97.5 °F (36.4 °C)   BP  Min: 111/58  Max: 134/81 134/81   Pulse  Min: 63  Max: 72  67   Resp  Min: 20  Max: 20 20   SpO2  Min: 92 %  Max: 95 % 95 %     I reviewed the labs below:  Recent Labs   Lab 03/30/25 0445 03/31/25 0415 04/01/25  0557   WBC 20.29* 16.54* 14.72*   RBC 5.20 5.52 5.00   HGB 15.7 16.4 14.8   HCT 45.4 48.7 45.3   MCV 87.3 88.2 90.6   MCH 30.2 29.7 29.6   MCHC 34.6 33.7 32.7*   RDW 13.4 13.9 13.9    230 191   MPV 8.9 8.8 9.1     Recent Labs   Lab 03/27/25 2125 03/28/25 0401 03/30/25 0445 03/31/25 0415 04/01/25  0558   NA  --    < > 141 139 142   K  --    < > 3.9 4.3 4.0   CL  --    < > 110* 113* 110*   CO2  --    < > 21* 18* 25   BUN  --    < > 19.0 21.5 19.9   CREATININE  --    < > 0.63* 0.73 0.69*   CALCIUM  --    < > 8.9 8.6* 8.8   PH 7.450  --   --   --   --    MG  --   --   --   --  2.10   ALBUMIN  --    < > 5.1* 4.9* 4.5   ALKPHOS  --    < > 25* 15* 27*   ALT  --    < > 16 36 41   AST  --    < > 17 25 16   BILITOT  --    < > 2.3* 2.7* 2.4*    < > = values in this interval not displayed.     Microbiology Results (last 7 days)       Procedure Component Value Units Date/Time    Blood Culture [[id number]]  (Normal) Collected: 03/27/25 1802    Order Status: Completed Specimen: Blood, Venous Updated: 03/31/25 2002     Blood Culture No Growth At 96 Hours    Blood Culture [[id number]]  (Normal) Collected: 03/27/25 1802    Order Status: Completed Specimen: Blood, Venous Updated:  03/31/25 1901     Blood Culture No Growth At 96 Hours           See below for Radiology    Intake and Output:  Intake/Output Summary (Last 24 hours) at 4/1/2025 1227  Last data filed at 4/1/2025 1203  Gross per 24 hour   Intake 1002.03 ml   Output 4500 ml   Net -3497.97 ml         All diagnosis and differential diagnosis have been reviewed; assessment and plan has been documented; I have personally reviewed the labs and test results that are presently available; I have reviewed the patients medication list; I have reviewed the consulting providers response and recommendations. I have reviewed or attempted to review medical records based upon their availability    All of the patient's questions have been  addressed and answered. Patient's is agreeable to the above stated plan. I will continue to monitor closely and make adjustments to medical management as needed.    Portions of this note dictated using EMR integrated voice recognition software, and may be subject to voice recognition errors not corrected at proofreading. Please contact writer for clarification if needed.   _____________________________________________________________________    Nutrition Status:  Nutrition consulted. Most recent weight and BMI monitored-     Measurements:  Wt Readings from Last 1 Encounters:   03/30/25 108 kg (238 lb 1.6 oz)   Body mass index is 34.16 kg/m².    Patient has been screened and assessed by RD.    Malnutrition Type:  Context:    Level: other (see comments) (Does not meet criteria)    Malnutrition Characteristic Summary:  Weight Loss (Malnutrition): other (see comments) (Does not meet criteria)  Fluid Accumulation (Malnutrition): other (see comments) (Not present)    Interventions/Recommendations (treatment strategy):         A minimum of two characteristics is recommended for diagnosis of either severe or non-severe malnutrition.     Current Med:   0.9%  NaCl infusion (for blood administration)   Intravenous Once in  dialysis    chlorhexidine  15 mL Mouth/Throat BID    ciprofloxacin-dexAMETHasone 0.3-0.1%  4 drop Both Ears BID    enoxparin  40 mg Subcutaneous Daily    famotidine (PF)  20 mg Intravenous Q12H    melatonin  9 mg Oral Nightly    mupirocin   Nasal BID    predniSONE  20 mg Oral Daily      PRN meds:  Current Facility-Administered Medications:     acetaminophen, 650 mg, Oral, Q4H PRN    calcium gluconate IVPB, 1 g, Intravenous, PRN    calcium gluconate IVPB, 2 g, Intravenous, PRN    calcium gluconate IVPB, 3 g, Intravenous, PRN    hydrALAZINE, 10 mg, Intravenous, Q6H PRN    hydrALAZINE, 10 mg, Intravenous, Q4H PRN    ketorolac, 15 mg, Intravenous, Q6H PRN    ondansetron, 4 mg, Intravenous, Q8H PRN    potassium chloride, 40 mEq, Intravenous, PRN    sodium chloride 0.9%, 10 mL, Intravenous, PRN    sodium phosphate 15 mmol in D5W 250 mL IVPB, 15 mmol, Intravenous, PRN    sodium phosphate 20.1 mmol in D5W 250 mL IVPB, 20.1 mmol, Intravenous, PRN    sodium phosphate 30 mmol in D5W 250 mL IVPB, 30 mmol, Intravenous, PRN    Continuous infusion:       Radiology:   I have personally reviewed the images and agree with radiologist report  X-Ray Chest 1 View  Narrative: EXAMINATION:  XR CHEST 1 VIEW    CLINICAL HISTORY:  respiratory failure;    TECHNIQUE:  One view    COMPARISON:  March 27, 2025..    FINDINGS:  Cardiopericardial silhouette is within normal limits.  Much improved right lower lung lobe opacities.  New left lower lung lobe atelectatic changes.  No pulmonary edema.  No pneumothorax.  No significant fluid within the pleural spaces.  Optimal intubation.  Impression: Improved right lower lung zone atelectasis and new left basilar atelectatic changes.    Electronically signed by: Bjorn Perera  Date:    03/29/2025  Time:    08:23        Chito Nolan MD  Department of Hospital Medicine   [facility]   04/01/2025

## 2025-04-01 NOTE — NURSING
04/01/25 1203   Post-Hemodialysis Assessment   Total UF (mL) 4500 mL   Post-Hemodialysis Comments 4.5 L albumin plasma exchange done without issue. vss throughout. cvc fx fair. did have some alarming at beginning of tx. otherwise no issue.

## 2025-04-01 NOTE — PROGRESS NOTES
Renal  Pt seen and examined  Meds and labs and events reviewed  Feels better  Labs reviewed  Fibrinogen 272    Will Pherese today  Impression  Myasthenia gravis exacerbation    P  Pherese today

## 2025-04-01 NOTE — PROGRESS NOTES
Patient's insurance has assigned an RN Care Manager to assist with dc planning if needed.  Care Manager's name is Linh & her contact # is [phone].

## 2025-04-01 NOTE — PROGRESS NOTES
Patient seen earlier this morning.  Fiber energy was good so hopefully he will get a Plex today if not already done.  His secretions have improved.  He is eager for discharge    Exam: wife at bedside. Patient speech seems good. He shows me he can whistle. He has been eating and drinking.    Dialogue with nursing    Problem list:  Myasthenia gravis crisis  Pre-existing diabetes.  Recent upper respiratory infection.  Plan:  After fifth plasmapheresis treatment hopefully discharged home  Prednisone 20 mg daily.  Hopefully get some ninja cock vaccine either while in the hospital or shortly after discharge  Joanna IG to start once gets home.    Please excuse electronic transcription errors

## 2025-04-02 VITALS
DIASTOLIC BLOOD PRESSURE: 64 MMHG | TEMPERATURE: 98 F | SYSTOLIC BLOOD PRESSURE: 122 MMHG | HEART RATE: 66 BPM | RESPIRATION RATE: 18 BRPM | WEIGHT: 238.13 LBS | BODY MASS INDEX: 34.09 KG/M2 | OXYGEN SATURATION: 96 % | HEIGHT: 70 IN

## 2025-04-02 PROBLEM — G70.01 MYASTHENIC CRISIS: Status: ACTIVE | Noted: 2024-01-30

## 2025-04-02 LAB
ALBUMIN SERPL-MCNC: 4.8 G/DL (ref 3.4–4.8)
ALBUMIN/GLOB SERPL: 6 RATIO (ref 1.1–2)
ALP SERPL-CCNC: 20 UNIT/L (ref 40–150)
ALT SERPL-CCNC: 17 UNIT/L (ref 0–55)
ANION GAP SERPL CALC-SCNC: 8 MEQ/L
AST SERPL-CCNC: 11 UNIT/L (ref 11–45)
BILIRUB SERPL-MCNC: 2.2 MG/DL
BUN SERPL-MCNC: 14.5 MG/DL (ref 8.4–25.7)
CALCIUM SERPL-MCNC: 8.5 MG/DL (ref 8.8–10)
CHLORIDE SERPL-SCNC: 112 MMOL/L (ref 98–107)
CO2 SERPL-SCNC: 20 MMOL/L (ref 23–31)
CREAT SERPL-MCNC: 0.66 MG/DL (ref 0.72–1.25)
CREAT/UREA NIT SERPL: 22
FIBRINOGEN PPP-MCNC: 186 MG/DL (ref 210–463)
GFR SERPLBLD CREATININE-BSD FMLA CKD-EPI: >60 ML/MIN/1.73/M2
GLOBULIN SER-MCNC: 0.8 GM/DL (ref 2.4–3.5)
GLUCOSE SERPL-MCNC: 92 MG/DL (ref 82–115)
POTASSIUM SERPL-SCNC: 3.8 MMOL/L (ref 3.5–5.1)
PROT SERPL-MCNC: 5.6 GM/DL (ref 5.8–7.6)
SODIUM SERPL-SCNC: 140 MMOL/L (ref 136–145)

## 2025-04-02 PROCEDURE — 90471 IMMUNIZATION ADMIN: CPT | Performed by: SPECIALIST

## 2025-04-02 PROCEDURE — 90620 MENB-4C VACCINE IM: CPT | Performed by: SPECIALIST

## 2025-04-02 PROCEDURE — 99232 SBSQ HOSP IP/OBS MODERATE 35: CPT | Mod: ,,, | Performed by: SPECIALIST

## 2025-04-02 PROCEDURE — P9045 ALBUMIN (HUMAN), 5%, 250 ML: HCPCS | Mod: JZ | Performed by: INTERNAL MEDICINE

## 2025-04-02 PROCEDURE — 36415 COLL VENOUS BLD VENIPUNCTURE: CPT | Performed by: INTERNAL MEDICINE

## 2025-04-02 PROCEDURE — 80053 COMPREHEN METABOLIC PANEL: CPT | Performed by: INTERNAL MEDICINE

## 2025-04-02 PROCEDURE — 63600175 PHARM REV CODE 636 W HCPCS: Mod: JZ | Performed by: INTERNAL MEDICINE

## 2025-04-02 PROCEDURE — 90734 MENACWYD/MENACWYCRM VACC IM: CPT | Performed by: SPECIALIST

## 2025-04-02 PROCEDURE — 85384 FIBRINOGEN ACTIVITY: CPT | Performed by: INTERNAL MEDICINE

## 2025-04-02 PROCEDURE — 25000003 PHARM REV CODE 250: Performed by: INTERNAL MEDICINE

## 2025-04-02 PROCEDURE — 36514 APHERESIS PLASMA: CPT

## 2025-04-02 PROCEDURE — 25000003 PHARM REV CODE 250

## 2025-04-02 PROCEDURE — 90472 IMMUNIZATION ADMIN EACH ADD: CPT | Performed by: SPECIALIST

## 2025-04-02 PROCEDURE — 63600175 PHARM REV CODE 636 W HCPCS

## 2025-04-02 PROCEDURE — 63600175 PHARM REV CODE 636 W HCPCS: Performed by: SPECIALIST

## 2025-04-02 RX ORDER — PREDNISONE 20 MG/1
20 TABLET ORAL DAILY
Qty: 30 TABLET | Refills: 0 | Status: SHIPPED | OUTPATIENT
Start: 2025-04-03 | End: 2025-05-03

## 2025-04-02 RX ORDER — HYDROCODONE BITARTRATE AND ACETAMINOPHEN 500; 5 MG/1; MG/1
TABLET ORAL
Status: DISCONTINUED | OUTPATIENT
Start: 2025-04-02 | End: 2025-04-02 | Stop reason: HOSPADM

## 2025-04-02 RX ORDER — ALBUMIN HUMAN 50 G/1000ML
225 SOLUTION INTRAVENOUS ONCE
Status: COMPLETED | OUTPATIENT
Start: 2025-04-02 | End: 2025-04-02

## 2025-04-02 RX ORDER — PYRIDOSTIGMINE BROMIDE 60 MG/1
30 TABLET ORAL EVERY 6 HOURS
Qty: 60 TABLET | Refills: 11 | Status: SHIPPED | OUTPATIENT
Start: 2025-04-02 | End: 2026-04-02

## 2025-04-02 RX ORDER — CALCIUM GLUCONATE 20 MG/ML
3 INJECTION, SOLUTION INTRAVENOUS
Status: DISCONTINUED | OUTPATIENT
Start: 2025-04-02 | End: 2025-04-02 | Stop reason: HOSPADM

## 2025-04-02 RX ORDER — AMOXICILLIN AND CLAVULANATE POTASSIUM 875; 125 MG/1; MG/1
1 TABLET, FILM COATED ORAL EVERY 12 HOURS
Qty: 12 TABLET | Refills: 0 | Status: SHIPPED | OUTPATIENT
Start: 2025-04-02 | End: 2025-04-08

## 2025-04-02 RX ADMIN — AMOXICILLIN AND CLAVULANATE POTASSIUM 1 TABLET: 875; 125 TABLET, FILM COATED ORAL at 08:04

## 2025-04-02 RX ADMIN — ALBUMIN (HUMAN) 225 G: 12.5 SOLUTION INTRAVENOUS at 02:04

## 2025-04-02 RX ADMIN — CIPROFLOXACIN AND DEXAMETHASONE 4 DROP: 3; 1 SUSPENSION/ DROPS AURICULAR (OTIC) at 08:04

## 2025-04-02 RX ADMIN — CALCIUM GLUCONATE 3 G: 20 INJECTION, SOLUTION INTRAVENOUS at 03:04

## 2025-04-02 RX ADMIN — KETOROLAC TROMETHAMINE 15 MG: 30 INJECTION, SOLUTION INTRAMUSCULAR; INTRAVENOUS at 08:04

## 2025-04-02 RX ADMIN — NEISSERIA MENINGITIDIS SEROGROUP B NHBA FUSION PROTEIN ANTIGEN, NEISSERIA MENINGITIDIS SEROGROUP B FHBP FUSION PROTEIN ANTIGEN AND NEISSERIA MENINGITIDIS SEROGROUP B NADA PROTEIN ANTIGEN 0.5 ML: 50; 50; 50; 25 INJECTION, SUSPENSION INTRAMUSCULAR at 04:04

## 2025-04-02 RX ADMIN — Medication 0.5 ML: at 04:04

## 2025-04-02 RX ADMIN — CHLORHEXIDINE GLUCONATE 0.12% ORAL RINSE 15 ML: 1.2 LIQUID ORAL at 08:04

## 2025-04-02 RX ADMIN — PREDNISONE 20 MG: 20 TABLET ORAL at 08:04

## 2025-04-02 RX ADMIN — FAMOTIDINE 20 MG: 10 INJECTION, SOLUTION INTRAVENOUS at 08:04

## 2025-04-02 NOTE — PROGRESS NOTES
"Did not sleep well  eager for disch to home after upcoming 5th plex   ...  Exam:   /64 (Patient Position: Sitting)   Pulse 66   Temp 97.9 °F (36.6 °C) (Oral)   Resp 18   Ht 5' 10" (1.778 m)   Wt 108 kg (238 lb 1.6 oz)   SpO2 96%   BMI 34.16 kg/m²   Alert   Speech normal and puffs cheeks and again demonstrates can whistle   Arose from chair w arms folded at my requests   No ptosis and gaze /EOM's normal       Labs:    Imaging:     amoxicillin-clav 875-125mg  1 tablet Oral Q12H    chlorhexidine  15 mL Mouth/Throat BID    ciprofloxacin-dexAMETH  4 drop Both Ears BID    enoxparin  40 mg Subcutaneous Daily    famotidine (PF)  20 mg Intravenous Q12H    melatonin  9 mg Oral Nightly    meningococ vac A,C,Y,W135 dip (PF)  0.5 mL Intramuscular Once    meningococcal group B vaccine (PF)  0.5 mL Intramuscular Once    predniSONE  20 mg Oral Daily        Prob list:   MG exac     Plan/Rec's  Home after last exch and mening vaccine please     Ivig once home   Will discuss with my clinic pharmacist Earl ideally details/dosing   Plans for ultomiris or similar in weeks ahead     "

## 2025-04-02 NOTE — DISCHARGE INSTRUCTIONS
Our goal at [facility] is to always give you outstanding care and exceptional service. You may receive a survey from [employer] by mail, text or e-mail in the next 24-48 hours asking about the care you received with us. The survey should only take 5-10 minutes to complete and is very important to us.     Your feedback provides us with a way to recognize our staff who work tirelessly to provide the best care! Also, your responses help us learn how to improve when your experience was below our aspiration of excellence. We are always looking for ways to improve your stay. We WILL use your feedback to continue making improvements to help us provide the highest quality care. We keep your personal information and feedback confidential. We appreciate your time completing this survey and can't wait to hear from you!!!    We look forward to your continued care with us! Thanks so much for choosing [facility] for your healthcare needs!

## 2025-04-02 NOTE — PROGRESS NOTES
Renal  Patient is seen and examined  Meds labs and events reviewed  No new physical finding      Impression  Myasthenia gravis exacerbation requiring plasma exchange    Plan  Fibrinogen 186  Okay to do the last pheresis treatment today    If cleared by neuro and primary okay to discharge  We will make sure dialysis catheter removed after pheresis

## 2025-04-03 ENCOUNTER — PATIENT OUTREACH (OUTPATIENT)
Dept: ADMINISTRATIVE | Facility: CLINIC | Age: 65
End: 2025-04-03
Payer: COMMERCIAL

## 2025-04-03 NOTE — PROGRESS NOTES
C3 nurse spoke with patient for [facility] post hospital discharge follow up call. The patient has a scheduled HOSFU appointment.

## 2025-04-04 ENCOUNTER — PATIENT MESSAGE (OUTPATIENT)
Dept: NEUROLOGY | Facility: CLINIC | Age: 65
End: 2025-04-04
Payer: COMMERCIAL

## 2025-04-14 ENCOUNTER — PATIENT MESSAGE (OUTPATIENT)
Dept: NEUROLOGY | Facility: CLINIC | Age: 65
End: 2025-04-14

## 2025-04-14 ENCOUNTER — OFFICE VISIT (OUTPATIENT)
Dept: NEUROLOGY | Facility: CLINIC | Age: 65
End: 2025-04-14
Payer: COMMERCIAL

## 2025-04-14 VITALS
BODY MASS INDEX: 33.6 KG/M2 | DIASTOLIC BLOOD PRESSURE: 76 MMHG | HEIGHT: 71 IN | SYSTOLIC BLOOD PRESSURE: 138 MMHG | WEIGHT: 240 LBS

## 2025-04-14 DIAGNOSIS — G70.00 MYASTHENIA GRAVIS: Primary | ICD-10-CM

## 2025-04-14 PROCEDURE — 99215 OFFICE O/P EST HI 40 MIN: CPT | Mod: S$GLB,,, | Performed by: NURSE PRACTITIONER

## 2025-04-14 PROCEDURE — 99999 PR PBB SHADOW E&M-EST. PATIENT-LVL III: CPT | Mod: PBBFAC,,, | Performed by: NURSE PRACTITIONER

## 2025-04-14 RX ORDER — PREDNISONE 10 MG/1
10 TABLET ORAL DAILY
Qty: 30 TABLET | Refills: 3 | Status: SHIPPED | OUTPATIENT
Start: 2025-04-14

## 2025-04-14 NOTE — PROGRESS NOTES
Neurology Follow up Note    Subjective:         Patient ID: Vignesh Lomas is a 65 y.o. male.    Chief Complaint: myasthenia gravis     HPI:            Was admitted to [facility] on 04/03/2025 for MG crisis. Underwent PLEX, was discharged on 04/09/2025. States that he has had no problems since his discharge. He has been doing very well since. Denies dyspnea or choking or swallowing issues.     Now receiving IVIG infusions - first round was last week, now will receive 1 dose per week for the next 4 weeks.     Prednisone 20 mg daily   Mestinon 60 mg QID    He received his 1st Men A/B vaccine 04/02/25    ROS: as per HPI, otherwise pertinent systems review is negative          Past Medical History:   Diagnosis Date    Gout, unspecified        Past Surgical History:   Procedure Laterality Date    FOOT SURGERY Left     02/08/2023 achilles tendon    HERNIA REPAIR      HERNIA REPAIR      SKIN CANCER EXCISION         No family history on file.    Social History     Socioeconomic History    Marital status:    Tobacco Use    Smoking status: Never    Smokeless tobacco: Never   Substance and Sexual Activity    Alcohol use: Not Currently     Comment: beer/wine 1-2 times per week    Drug use: Never     Social Drivers of Health     Financial Resource Strain: Low Risk  (3/27/2025)    Overall Financial Resource Strain (CARDIA)     Difficulty of Paying Living Expenses: Not very hard   Food Insecurity: No Food Insecurity (3/27/2025)    Hunger Vital Sign     Worried About Running Out of Food in the Last Year: Never true     Ran Out of Food in the Last Year: Never true   Transportation Needs: No Transportation Needs (3/27/2025)    PRAPARE - Transportation     Lack of Transportation (Medical): No     Lack of Transportation (Non-Medical): No   Physical Activity: Inactive (3/27/2025)    Exercise Vital Sign     Days of Exercise per Week: 0 days     Minutes of Exercise per Session: 0 min   Stress: Stress Concern Present (3/27/2025)     "[facility] of Occupational Health - Occupational Stress Questionnaire     Feeling of Stress : To some extent   Housing Stability: Low Risk  (3/27/2025)    Housing Stability Vital Sign     Unable to Pay for Housing in the Last Year: No     Homeless in the Last Year: No       Review of patient's allergies indicates:  No Known Allergies    Current Outpatient Medications   Medication Instructions    azelastine (ASTELIN) 137 mcg (0.1 %) nasal spray 2 sprays, Nightly PRN    predniSONE (DELTASONE) 20 mg, Oral, Daily    pyridostigmine (MESTINON) 30 mg, Oral, Every 6 hours, Takes at 0600, 1000, 1400, and 1800       Objective:      Exam:   Visit Vitals  /76 (BP Location: Right arm, Patient Position: Sitting)   Ht 5' 11" (1.803 m)   Wt 108.9 kg (240 lb)   BMI 33.47 kg/m²       Physical Exam  Vitals reviewed.   Constitutional:       Appearance: Normal appearance.      Accompanied by: wife   HENT:      Ears:      Comments: Hearing normal.  Eyes:      Extraocular Movements: Extraocular movements intact. B ptosis but      subtle   Cardiovascular:      Rate and Rhythm: Normal rate and regular rhythm.   Pulmonary:      Effort: Pulmonary effort is normal.      Breath sounds: Normal breath sounds.     No shortness of breath    Musculoskeletal:         General: Normal range of motion.   Skin:     General: Skin is warm and dry.      Red rash isolated to R clavicular and R AC area  Neurological:      General: No focal deficit present.      Mental Status: alert and oriented     Speech: nml     Face: symmetric; tongue midline; cheek puff strong     Motor: nonlateralizing     Gait: unassisted and normal. Stands from chair with arms folded on      first attempt.  Psychiatric:         Mood and Affect: Mood normal.         Behavior: Behavior normal.         Assessment/Plan:   1. Myasthenia gravis (Primary)  Overall, much improved    Decrease the Prednisone to 20 mg alt w/10 mg every other day for 2 weeks then decrease to 10 mg " daily thereafter.    Continue Mestinon 60 mg QID     Continue the IVIG weekly for the next 4 weeks - will FU at that point to determine start of Ultimaris.    He has received the 1st dose of Men A/B vaccine 04/02/2025    Dr. Alonso physically present in exam room during patient encounter.     Follow up in about 4 weeks (around 5/12/2025), or if symptoms worsen or fail to improve. Virtual visit       Hector Patino, MSN, APRN, AGACNP-BC

## 2025-04-29 ENCOUNTER — TELEPHONE (OUTPATIENT)
Dept: NEUROLOGY | Facility: CLINIC | Age: 65
End: 2025-04-29
Payer: COMMERCIAL

## 2025-05-20 ENCOUNTER — OFFICE VISIT (OUTPATIENT)
Dept: NEUROLOGY | Facility: CLINIC | Age: 65
End: 2025-05-20
Payer: COMMERCIAL

## 2025-05-20 VITALS
SYSTOLIC BLOOD PRESSURE: 128 MMHG | BODY MASS INDEX: 33.6 KG/M2 | HEIGHT: 71 IN | DIASTOLIC BLOOD PRESSURE: 74 MMHG | WEIGHT: 240 LBS

## 2025-05-20 DIAGNOSIS — G70.00 MYASTHENIA GRAVIS: Primary | ICD-10-CM

## 2025-05-20 PROCEDURE — 99215 OFFICE O/P EST HI 40 MIN: CPT | Mod: S$GLB,,, | Performed by: NURSE PRACTITIONER

## 2025-05-20 PROCEDURE — 99999 PR PBB SHADOW E&M-EST. PATIENT-LVL III: CPT | Mod: PBBFAC,,, | Performed by: NURSE PRACTITIONER

## 2025-05-20 NOTE — Clinical Note
Can you please make sure he gets his IVIG - his MD in [place] asked to continue but every 10 days now last infusion was 05/14/2025

## 2025-05-20 NOTE — PROGRESS NOTES
Neurology Follow up Note    Subjective:         Patient ID: Vignesh Lomas is a 65 y.o. male.    Chief Complaint: F/U-MG    HPI:            Overall, doing great; Pt said he feels good.     Tolerating the IVIG once per week infusions w/o SE's    Denies MG symptoms or worsening of symptoms    Visited with Dr. Dueñas 05/15/2025 at [facility] - they ask that he continue present management but IVIG infusions every 10-14 days -continue the Prednisone 10 mg daily and Mestinon 60 mg q 4hrs     Denies SOB  Denies diplopia  Denies choking or swallowing problems   Denies weakness      ROS: as per HPI, otherwise pertinent systems review is negative          Past Medical History:   Diagnosis Date    Gout, unspecified        Past Surgical History:   Procedure Laterality Date    FOOT SURGERY Left     02/08/2023 achilles tendon    HERNIA REPAIR      HERNIA REPAIR      SKIN CANCER EXCISION         No family history on file.    Social History     Socioeconomic History    Marital status:    Tobacco Use    Smoking status: Never    Smokeless tobacco: Never   Substance and Sexual Activity    Alcohol use: Not Currently     Comment: beer/wine 1-2 times per week    Drug use: Never     Social Drivers of Health     Financial Resource Strain: Low Risk  (3/27/2025)    Overall Financial Resource Strain (CARDIA)     Difficulty of Paying Living Expenses: Not very hard   Food Insecurity: No Food Insecurity (3/27/2025)    Hunger Vital Sign     Worried About Running Out of Food in the Last Year: Never true     Ran Out of Food in the Last Year: Never true   Transportation Needs: No Transportation Needs (3/27/2025)    PRAPARE - Transportation     Lack of Transportation (Medical): No     Lack of Transportation (Non-Medical): No   Physical Activity: Inactive (3/27/2025)    Exercise Vital Sign     Days of Exercise per Week: 0 days     Minutes of Exercise per Session: 0 min   Stress: Stress Concern Present (3/27/2025)    [place] of  "Occupational Health - Occupational Stress Questionnaire     Feeling of Stress : To some extent   Housing Stability: Low Risk  (3/27/2025)    Housing Stability Vital Sign     Unable to Pay for Housing in the Last Year: No     Homeless in the Last Year: No       Review of patient's allergies indicates:  No Known Allergies    Current Outpatient Medications   Medication Instructions    azelastine (ASTELIN) 137 mcg (0.1 %) nasal spray 2 sprays, Nightly PRN    predniSONE (DELTASONE) 10 mg, Oral, Daily    pyridostigmine (MESTINON) 30 mg, Oral, Every 6 hours, Takes at 0600, 1000, 1400, and 1800    Mestinon ER  180 mg at bed time        Objective:      Exam:   Visit Vitals  /74 (BP Location: Left arm, Patient Position: Sitting)   Ht 5' 11" (1.803 m)   Wt 108.9 kg (240 lb)   BMI 33.47 kg/m²       Physical Exam  Vitals reviewed.   Constitutional:       Appearance: Normal appearance.      Accompanied by: alone   HENT:      Ears:      Comments: Hearing normal.  Eyes:      Extraocular Movements: Extraocular movements intact. B ptosis but      subtle   Cardiovascular:      Rate and Rhythm: Normal rate and regular rhythm.   Pulmonary:      Effort: Pulmonary effort is normal.      Breath sounds: Normal breath sounds.     No shortness of breath    Musculoskeletal:         General: Normal range of motion.   Skin:     General: Skin is warm and dry.      No rash today   Neurological:      General: No focal deficit present.      Mental Status: alert and oriented     Speech: nml     Face: symmetric; tongue midline; cheek puff strong     Motor: nonlateralizing - deltoids not fatigable     Gait: unassisted and normal. Stands from chair with arms folded on      first attempt.  Psychiatric:         Mood and Affect: Mood normal.         Behavior: Behavior normal.   CN's intact         Assessment/Plan:   1. Myasthenia gravis (Primary)    - CBC auto differential  - Comprehensive metabolic panel    IVIG 400 mg/kg IV q10 days x 4 months "   Continue the Prednisone 10 mg qday  Continue the Mestinon 60 mg every four hours     Continue the Time span 180 mg at bed time     Follow up in about 3 months (around 8/20/2025), or if symptoms worsen or fail to improve.      Hector Patino, MSN, APRN, AGACNP-BC

## 2025-05-28 ENCOUNTER — TELEPHONE (OUTPATIENT)
Dept: NEUROLOGY | Facility: CLINIC | Age: 65
End: 2025-05-28
Payer: COMMERCIAL

## 2025-06-05 ENCOUNTER — TELEPHONE (OUTPATIENT)
Dept: NEUROLOGY | Facility: CLINIC | Age: 65
End: 2025-06-05
Payer: COMMERCIAL

## 2025-06-11 DIAGNOSIS — G70.00 MYASTHENIA GRAVIS: Primary | ICD-10-CM

## 2025-06-11 RX ORDER — PYRIDOSTIGMINE BROMIDE 180 MG/1
180 TABLET, EXTENDED RELEASE ORAL DAILY
Qty: 90 TABLET | Refills: 3 | Status: SHIPPED | OUTPATIENT
Start: 2025-06-11

## 2025-06-11 RX ORDER — PYRIDOSTIGMINE BROMIDE 60 MG/1
60 TABLET ORAL 3 TIMES DAILY
Qty: 270 TABLET | Refills: 3 | Status: SHIPPED | OUTPATIENT
Start: 2025-06-11

## 2025-06-24 DIAGNOSIS — G70.00 MYASTHENIA GRAVIS: ICD-10-CM

## 2025-06-24 RX ORDER — PYRIDOSTIGMINE BROMIDE 60 MG/1
TABLET ORAL
Refills: 0 | OUTPATIENT
Start: 2025-06-24

## 2025-08-26 ENCOUNTER — OFFICE VISIT (OUTPATIENT)
Dept: NEUROLOGY | Facility: CLINIC | Age: 65
End: 2025-08-26
Payer: COMMERCIAL

## 2025-08-26 VITALS
WEIGHT: 260 LBS | BODY MASS INDEX: 36.4 KG/M2 | SYSTOLIC BLOOD PRESSURE: 128 MMHG | DIASTOLIC BLOOD PRESSURE: 76 MMHG | HEIGHT: 71 IN

## 2025-08-26 DIAGNOSIS — G70.00 MYASTHENIA GRAVIS: Primary | ICD-10-CM

## 2025-08-26 PROCEDURE — 99999 PR PBB SHADOW E&M-EST. PATIENT-LVL III: CPT | Mod: PBBFAC,,, | Performed by: NURSE PRACTITIONER

## 2025-08-26 PROCEDURE — 99215 OFFICE O/P EST HI 40 MIN: CPT | Mod: S$GLB,,, | Performed by: NURSE PRACTITIONER

## 2025-08-26 RX ORDER — HUMAN IMMUNOGLOBULIN G 5 G/50ML
SOLUTION INTRAVENOUS
COMMUNITY
Start: 2025-06-10